# Patient Record
Sex: MALE | Race: WHITE | NOT HISPANIC OR LATINO | RURAL
[De-identification: names, ages, dates, MRNs, and addresses within clinical notes are randomized per-mention and may not be internally consistent; named-entity substitution may affect disease eponyms.]

---

## 2018-11-28 ENCOUNTER — HISTORICAL (OUTPATIENT)
Dept: ADMINISTRATIVE | Facility: HOSPITAL | Age: 73
End: 2018-11-28

## 2018-11-30 LAB
LAB AP CLINICAL INFORMATION: NORMAL
LAB AP COMMENTS: NORMAL
LAB AP DIAGNOSIS - HISTORICAL: NORMAL
LAB AP GROSS PATHOLOGY - HISTORICAL: NORMAL
LAB AP SPECIMEN SUBMITTED - HISTORICAL: NORMAL

## 2018-12-17 ENCOUNTER — HISTORICAL (OUTPATIENT)
Dept: ADMINISTRATIVE | Facility: HOSPITAL | Age: 73
End: 2018-12-17

## 2018-12-19 LAB
LAB AP CLINICAL INFORMATION: NORMAL
LAB AP DIAGNOSIS - HISTORICAL: NORMAL
LAB AP GROSS PATHOLOGY - HISTORICAL: NORMAL
LAB AP SPECIMEN SUBMITTED - HISTORICAL: NORMAL

## 2020-06-17 ENCOUNTER — HISTORICAL (OUTPATIENT)
Dept: ADMINISTRATIVE | Facility: HOSPITAL | Age: 75
End: 2020-06-17

## 2020-06-19 LAB
INSULIN SERPL-ACNC: NORMAL U[IU]/ML
LAB AP CLINICAL INFORMATION: NORMAL
LAB AP COMMENTS: NORMAL
LAB AP DIAGNOSIS - HISTORICAL: NORMAL
LAB AP GROSS PATHOLOGY - HISTORICAL: NORMAL
LAB AP SPECIMEN SUBMITTED - HISTORICAL: NORMAL

## 2021-04-13 ENCOUNTER — OFFICE VISIT (OUTPATIENT)
Dept: DERMATOLOGY | Facility: CLINIC | Age: 76
End: 2021-04-13
Payer: COMMERCIAL

## 2021-04-13 VITALS — RESPIRATION RATE: 18 BRPM | HEIGHT: 73 IN | BODY MASS INDEX: 23.86 KG/M2 | WEIGHT: 180 LBS

## 2021-04-13 DIAGNOSIS — L82.1 SEBORRHEIC KERATOSES: ICD-10-CM

## 2021-04-13 DIAGNOSIS — B35.3 TINEA PEDIS OF BOTH FEET: Primary | ICD-10-CM

## 2021-04-13 DIAGNOSIS — Z79.899 HIGH RISK MEDICATION USE: ICD-10-CM

## 2021-04-13 DIAGNOSIS — L57.0 ACTINIC KERATOSES: ICD-10-CM

## 2021-04-13 DIAGNOSIS — L91.0 HYPERTROPHIC SCAR: ICD-10-CM

## 2021-04-13 PROCEDURE — 11900 INJECT SKIN LESIONS </W 7: CPT | Mod: XU,51,, | Performed by: DERMATOLOGY

## 2021-04-13 PROCEDURE — 11900 PR INJECTION INTO SKIN LESIONS, UP TO 7: ICD-10-PCS | Mod: XU,51,, | Performed by: DERMATOLOGY

## 2021-04-13 PROCEDURE — 99213 PR OFFICE/OUTPT VISIT, EST, LEVL III, 20-29 MIN: ICD-10-PCS | Mod: 25,,, | Performed by: DERMATOLOGY

## 2021-04-13 PROCEDURE — 1101F PT FALLS ASSESS-DOCD LE1/YR: CPT | Mod: ,,, | Performed by: DERMATOLOGY

## 2021-04-13 PROCEDURE — 17000 DESTRUCT PREMALG LESION: CPT | Mod: ,,, | Performed by: DERMATOLOGY

## 2021-04-13 PROCEDURE — 17003 DESTRUCT PREMALG LES 2-14: CPT | Mod: ,,, | Performed by: DERMATOLOGY

## 2021-04-13 PROCEDURE — 1159F MED LIST DOCD IN RCRD: CPT | Mod: ,,, | Performed by: DERMATOLOGY

## 2021-04-13 PROCEDURE — 99213 OFFICE O/P EST LOW 20 MIN: CPT | Mod: 25,,, | Performed by: DERMATOLOGY

## 2021-04-13 PROCEDURE — 1101F PR PT FALLS ASSESS DOC 0-1 FALLS W/OUT INJ PAST YR: ICD-10-PCS | Mod: ,,, | Performed by: DERMATOLOGY

## 2021-04-13 PROCEDURE — 3288F FALL RISK ASSESSMENT DOCD: CPT | Mod: ,,, | Performed by: DERMATOLOGY

## 2021-04-13 PROCEDURE — 1159F PR MEDICATION LIST DOCUMENTED IN MEDICAL RECORD: ICD-10-PCS | Mod: ,,, | Performed by: DERMATOLOGY

## 2021-04-13 PROCEDURE — 17000 PR DESTRUCTION(LASER SURGERY,CRYOSURGERY,CHEMOSURGERY),PREMALIGNANT LESIONS,FIRST LESION: ICD-10-PCS | Mod: ,,, | Performed by: DERMATOLOGY

## 2021-04-13 PROCEDURE — 3288F PR FALLS RISK ASSESSMENT DOCUMENTED: ICD-10-PCS | Mod: ,,, | Performed by: DERMATOLOGY

## 2021-04-13 PROCEDURE — 17003 DESTRUCTION, PREMALIGNANT LESIONS; SECOND THROUGH 14 LESIONS: ICD-10-PCS | Mod: ,,, | Performed by: DERMATOLOGY

## 2021-04-13 RX ORDER — SERTRALINE HYDROCHLORIDE 50 MG/1
TABLET, FILM COATED ORAL
COMMUNITY
Start: 2021-03-15

## 2021-04-13 RX ORDER — TERBINAFINE HYDROCHLORIDE 250 MG/1
250 TABLET ORAL DAILY
COMMUNITY
Start: 2021-01-13

## 2021-04-13 RX ORDER — APIXABAN 2.5 MG/1
TABLET, FILM COATED ORAL
COMMUNITY
Start: 2021-03-15

## 2021-04-13 RX ORDER — CELECOXIB 100 MG/1
100 CAPSULE ORAL DAILY
COMMUNITY
Start: 2021-03-11

## 2021-04-13 RX ORDER — TRIAMCINOLONE ACETONIDE 40 MG/ML
10 INJECTION, SUSPENSION INTRA-ARTICULAR; INTRAMUSCULAR
Status: COMPLETED | OUTPATIENT
Start: 2021-04-13 | End: 2021-04-13

## 2021-04-13 RX ORDER — ATORVASTATIN CALCIUM 10 MG/1
10 TABLET, FILM COATED ORAL DAILY
COMMUNITY
Start: 2021-04-05

## 2021-04-13 RX ORDER — OMEPRAZOLE 20 MG/1
CAPSULE, DELAYED RELEASE ORAL
COMMUNITY
Start: 2021-03-15

## 2021-04-13 RX ORDER — AMLODIPINE BESYLATE 5 MG/1
5 TABLET ORAL DAILY
COMMUNITY
Start: 2021-04-07

## 2021-04-13 RX ORDER — CLOBETASOL PROPIONATE 0.5 MG/G
CREAM TOPICAL
COMMUNITY
Start: 2021-01-13 | End: 2021-04-13

## 2021-04-13 RX ORDER — TIOTROPIUM BROMIDE 18 UG/1
CAPSULE ORAL; RESPIRATORY (INHALATION)
COMMUNITY
Start: 2021-03-15

## 2021-04-13 RX ORDER — BENZONATATE 100 MG/1
CAPSULE ORAL
COMMUNITY
Start: 2021-01-06

## 2021-04-13 RX ORDER — HYDROCODONE BITARTRATE AND ACETAMINOPHEN 7.5; 325 MG/1; MG/1
TABLET ORAL
COMMUNITY
Start: 2021-02-20

## 2021-04-13 RX ADMIN — TRIAMCINOLONE ACETONIDE 12 MG: 40 INJECTION, SUSPENSION INTRA-ARTICULAR; INTRAMUSCULAR at 02:04

## 2021-07-14 ENCOUNTER — OFFICE VISIT (OUTPATIENT)
Dept: DERMATOLOGY | Facility: CLINIC | Age: 76
End: 2021-07-14
Payer: COMMERCIAL

## 2021-07-14 DIAGNOSIS — L57.0 ACTINIC KERATOSES: ICD-10-CM

## 2021-07-14 DIAGNOSIS — Z85.828 HISTORY OF BASAL CELL CARCINOMA OF SKIN: ICD-10-CM

## 2021-07-14 DIAGNOSIS — L57.8 OTHER SKIN CHANGES DUE TO CHRONIC EXPOSURE TO NONIONIZING RADIATION: Primary | ICD-10-CM

## 2021-07-14 DIAGNOSIS — L82.1 SEBORRHEIC KERATOSES: ICD-10-CM

## 2021-07-14 DIAGNOSIS — D48.5 NEOPLASM OF UNCERTAIN BEHAVIOR OF SKIN: ICD-10-CM

## 2021-07-14 PROCEDURE — 17000 DESTRUCT PREMALG LESION: CPT | Mod: XU,,, | Performed by: DERMATOLOGY

## 2021-07-14 PROCEDURE — 88305 TISSUE EXAM BY PATHOLOGIST: CPT | Mod: 26,,, | Performed by: PATHOLOGY

## 2021-07-14 PROCEDURE — 99213 PR OFFICE/OUTPT VISIT, EST, LEVL III, 20-29 MIN: ICD-10-PCS | Mod: 25,,, | Performed by: DERMATOLOGY

## 2021-07-14 PROCEDURE — 1159F PR MEDICATION LIST DOCUMENTED IN MEDICAL RECORD: ICD-10-PCS | Mod: ,,, | Performed by: DERMATOLOGY

## 2021-07-14 PROCEDURE — 99213 OFFICE O/P EST LOW 20 MIN: CPT | Mod: 25,,, | Performed by: DERMATOLOGY

## 2021-07-14 PROCEDURE — 88305 TISSUE EXAM BY PATHOLOGIST: CPT | Mod: SUR | Performed by: DERMATOLOGY

## 2021-07-14 PROCEDURE — 17000 PR DESTRUCTION(LASER SURGERY,CRYOSURGERY,CHEMOSURGERY),PREMALIGNANT LESIONS,FIRST LESION: ICD-10-PCS | Mod: XU,,, | Performed by: DERMATOLOGY

## 2021-07-14 PROCEDURE — 11102 TANGNTL BX SKIN SINGLE LES: CPT | Mod: ,,, | Performed by: DERMATOLOGY

## 2021-07-14 PROCEDURE — 88305 PATHOLOGY, DERMATOLOGY: ICD-10-PCS | Mod: 26,,, | Performed by: PATHOLOGY

## 2021-07-14 PROCEDURE — 11102 PR TANGENTIAL BIOPSY, SKIN, SINGLE LESION: ICD-10-PCS | Mod: ,,, | Performed by: DERMATOLOGY

## 2021-07-14 PROCEDURE — 1159F MED LIST DOCD IN RCRD: CPT | Mod: ,,, | Performed by: DERMATOLOGY

## 2021-07-14 PROCEDURE — 17003 DESTRUCT PREMALG LES 2-14: CPT | Mod: XU,,, | Performed by: DERMATOLOGY

## 2021-07-14 PROCEDURE — 17003 DESTRUCTION, PREMALIGNANT LESIONS; SECOND THROUGH 14 LESIONS: ICD-10-PCS | Mod: XU,,, | Performed by: DERMATOLOGY

## 2021-07-16 LAB
ESTROGEN SERPL-MCNC: NORMAL PG/ML
LAB AP GROSS DESCRIPTION: NORMAL
LAB AP LABORATORY NOTES: NORMAL
LAB AP SPEC A DDX: NORMAL
LAB AP SPEC A MORPHOLOGY: NORMAL
LAB AP SPEC A PROCEDURE: NORMAL
T3RU NFR SERPL: NORMAL %

## 2021-07-26 ENCOUNTER — OFFICE VISIT (OUTPATIENT)
Dept: DERMATOLOGY | Facility: CLINIC | Age: 76
End: 2021-07-26
Payer: COMMERCIAL

## 2021-07-26 VITALS
BODY MASS INDEX: 23.84 KG/M2 | HEART RATE: 94 BPM | WEIGHT: 179.88 LBS | DIASTOLIC BLOOD PRESSURE: 89 MMHG | RESPIRATION RATE: 18 BRPM | SYSTOLIC BLOOD PRESSURE: 169 MMHG | HEIGHT: 73 IN | OXYGEN SATURATION: 97 %

## 2021-07-26 DIAGNOSIS — C44.311 BASAL CELL CARCINOMA (BCC) OF DORSUM OF NOSE: Primary | ICD-10-CM

## 2021-07-26 PROCEDURE — 1160F RVW MEDS BY RX/DR IN RCRD: CPT | Mod: ,,, | Performed by: STUDENT IN AN ORGANIZED HEALTH CARE EDUCATION/TRAINING PROGRAM

## 2021-07-26 PROCEDURE — 1101F PT FALLS ASSESS-DOCD LE1/YR: CPT | Mod: ,,, | Performed by: STUDENT IN AN ORGANIZED HEALTH CARE EDUCATION/TRAINING PROGRAM

## 2021-07-26 PROCEDURE — 1101F PR PT FALLS ASSESS DOC 0-1 FALLS W/OUT INJ PAST YR: ICD-10-PCS | Mod: ,,, | Performed by: STUDENT IN AN ORGANIZED HEALTH CARE EDUCATION/TRAINING PROGRAM

## 2021-07-26 PROCEDURE — 1160F PR REVIEW ALL MEDS BY PRESCRIBER/CLIN PHARMACIST DOCUMENTED: ICD-10-PCS | Mod: ,,, | Performed by: STUDENT IN AN ORGANIZED HEALTH CARE EDUCATION/TRAINING PROGRAM

## 2021-07-26 PROCEDURE — 3288F FALL RISK ASSESSMENT DOCD: CPT | Mod: ,,, | Performed by: STUDENT IN AN ORGANIZED HEALTH CARE EDUCATION/TRAINING PROGRAM

## 2021-07-26 PROCEDURE — 13152 CMPLX RPR E/N/E/L 2.6-7.5 CM: CPT | Mod: 51,,, | Performed by: STUDENT IN AN ORGANIZED HEALTH CARE EDUCATION/TRAINING PROGRAM

## 2021-07-26 PROCEDURE — 17311: ICD-10-PCS | Mod: ,,, | Performed by: STUDENT IN AN ORGANIZED HEALTH CARE EDUCATION/TRAINING PROGRAM

## 2021-07-26 PROCEDURE — 1159F MED LIST DOCD IN RCRD: CPT | Mod: ,,, | Performed by: STUDENT IN AN ORGANIZED HEALTH CARE EDUCATION/TRAINING PROGRAM

## 2021-07-26 PROCEDURE — 17311 MOHS 1 STAGE H/N/HF/G: CPT | Mod: ,,, | Performed by: STUDENT IN AN ORGANIZED HEALTH CARE EDUCATION/TRAINING PROGRAM

## 2021-07-26 PROCEDURE — 13152 PR RECMPL WND LID,NOS,EAR 2.6-7.5 CM: ICD-10-PCS | Mod: 51,,, | Performed by: STUDENT IN AN ORGANIZED HEALTH CARE EDUCATION/TRAINING PROGRAM

## 2021-07-26 PROCEDURE — 1159F PR MEDICATION LIST DOCUMENTED IN MEDICAL RECORD: ICD-10-PCS | Mod: ,,, | Performed by: STUDENT IN AN ORGANIZED HEALTH CARE EDUCATION/TRAINING PROGRAM

## 2021-07-26 PROCEDURE — 3288F PR FALLS RISK ASSESSMENT DOCUMENTED: ICD-10-PCS | Mod: ,,, | Performed by: STUDENT IN AN ORGANIZED HEALTH CARE EDUCATION/TRAINING PROGRAM

## 2021-08-02 ENCOUNTER — CLINICAL SUPPORT (OUTPATIENT)
Dept: DERMATOLOGY | Facility: CLINIC | Age: 76
End: 2021-08-02

## 2021-08-02 DIAGNOSIS — Z48.89 ENCOUNTER FOR POST SURGICAL WOUND CHECK: Primary | ICD-10-CM

## 2021-09-07 ENCOUNTER — OFFICE VISIT (OUTPATIENT)
Dept: DERMATOLOGY | Facility: CLINIC | Age: 76
End: 2021-09-07
Payer: COMMERCIAL

## 2021-09-07 VITALS — WEIGHT: 179 LBS | BODY MASS INDEX: 23.72 KG/M2 | RESPIRATION RATE: 18 BRPM | HEIGHT: 73 IN

## 2021-09-07 DIAGNOSIS — Z48.89 ENCOUNTER FOR POST SURGICAL WOUND CHECK: Primary | ICD-10-CM

## 2021-09-07 PROCEDURE — 99024 PR POST-OP FOLLOW-UP VISIT: ICD-10-PCS | Mod: ,,, | Performed by: STUDENT IN AN ORGANIZED HEALTH CARE EDUCATION/TRAINING PROGRAM

## 2021-09-07 PROCEDURE — 1159F PR MEDICATION LIST DOCUMENTED IN MEDICAL RECORD: ICD-10-PCS | Mod: ,,, | Performed by: STUDENT IN AN ORGANIZED HEALTH CARE EDUCATION/TRAINING PROGRAM

## 2021-09-07 PROCEDURE — 1101F PT FALLS ASSESS-DOCD LE1/YR: CPT | Mod: ,,, | Performed by: STUDENT IN AN ORGANIZED HEALTH CARE EDUCATION/TRAINING PROGRAM

## 2021-09-07 PROCEDURE — 3288F PR FALLS RISK ASSESSMENT DOCUMENTED: ICD-10-PCS | Mod: ,,, | Performed by: STUDENT IN AN ORGANIZED HEALTH CARE EDUCATION/TRAINING PROGRAM

## 2021-09-07 PROCEDURE — 1101F PR PT FALLS ASSESS DOC 0-1 FALLS W/OUT INJ PAST YR: ICD-10-PCS | Mod: ,,, | Performed by: STUDENT IN AN ORGANIZED HEALTH CARE EDUCATION/TRAINING PROGRAM

## 2021-09-07 PROCEDURE — 99024 POSTOP FOLLOW-UP VISIT: CPT | Mod: ,,, | Performed by: STUDENT IN AN ORGANIZED HEALTH CARE EDUCATION/TRAINING PROGRAM

## 2021-09-07 PROCEDURE — 1159F MED LIST DOCD IN RCRD: CPT | Mod: ,,, | Performed by: STUDENT IN AN ORGANIZED HEALTH CARE EDUCATION/TRAINING PROGRAM

## 2021-09-07 PROCEDURE — 3288F FALL RISK ASSESSMENT DOCD: CPT | Mod: ,,, | Performed by: STUDENT IN AN ORGANIZED HEALTH CARE EDUCATION/TRAINING PROGRAM

## 2022-01-18 ENCOUNTER — OFFICE VISIT (OUTPATIENT)
Dept: DERMATOLOGY | Facility: CLINIC | Age: 77
End: 2022-01-18
Payer: COMMERCIAL

## 2022-01-18 VITALS — WEIGHT: 179 LBS | HEIGHT: 73 IN | BODY MASS INDEX: 23.72 KG/M2 | RESPIRATION RATE: 18 BRPM

## 2022-01-18 DIAGNOSIS — D48.5 NEOPLASM OF UNCERTAIN BEHAVIOR OF SKIN: ICD-10-CM

## 2022-01-18 DIAGNOSIS — Z85.828 HISTORY OF BASAL CELL CARCINOMA (BCC): ICD-10-CM

## 2022-01-18 DIAGNOSIS — L82.1 SK (SEBORRHEIC KERATOSIS): ICD-10-CM

## 2022-01-18 DIAGNOSIS — L57.8 OTHER SKIN CHANGES DUE TO CHRONIC EXPOSURE TO NONIONIZING RADIATION: Primary | ICD-10-CM

## 2022-01-18 DIAGNOSIS — L57.0 ACTINIC KERATOSES: ICD-10-CM

## 2022-01-18 DIAGNOSIS — L82.0 SEBORRHEIC KERATOSES, INFLAMED: ICD-10-CM

## 2022-01-18 PROCEDURE — 3288F FALL RISK ASSESSMENT DOCD: CPT | Mod: ,,, | Performed by: DERMATOLOGY

## 2022-01-18 PROCEDURE — 99213 PR OFFICE/OUTPT VISIT, EST, LEVL III, 20-29 MIN: ICD-10-PCS | Mod: 25,,, | Performed by: DERMATOLOGY

## 2022-01-18 PROCEDURE — 3288F PR FALLS RISK ASSESSMENT DOCUMENTED: ICD-10-PCS | Mod: ,,, | Performed by: DERMATOLOGY

## 2022-01-18 PROCEDURE — 1159F PR MEDICATION LIST DOCUMENTED IN MEDICAL RECORD: ICD-10-PCS | Mod: ,,, | Performed by: DERMATOLOGY

## 2022-01-18 PROCEDURE — 1101F PT FALLS ASSESS-DOCD LE1/YR: CPT | Mod: ,,, | Performed by: DERMATOLOGY

## 2022-01-18 PROCEDURE — 88305 TISSUE EXAM BY PATHOLOGIST: CPT | Mod: SUR | Performed by: DERMATOLOGY

## 2022-01-18 PROCEDURE — 17003 DESTRUCTION, PREMALIGNANT LESIONS; SECOND THROUGH 14 LESIONS: ICD-10-PCS | Mod: XU,,, | Performed by: DERMATOLOGY

## 2022-01-18 PROCEDURE — 1160F PR REVIEW ALL MEDS BY PRESCRIBER/CLIN PHARMACIST DOCUMENTED: ICD-10-PCS | Mod: ,,, | Performed by: DERMATOLOGY

## 2022-01-18 PROCEDURE — 17110 PR DESTRUCTION BENIGN LESIONS UP TO 14: ICD-10-PCS | Mod: ,,, | Performed by: DERMATOLOGY

## 2022-01-18 PROCEDURE — 11102 PR TANGENTIAL BIOPSY, SKIN, SINGLE LESION: ICD-10-PCS | Mod: XU,51,, | Performed by: DERMATOLOGY

## 2022-01-18 PROCEDURE — 88305 TISSUE EXAM BY PATHOLOGIST: CPT | Mod: 26,,, | Performed by: PATHOLOGY

## 2022-01-18 PROCEDURE — 99213 OFFICE O/P EST LOW 20 MIN: CPT | Mod: 25,,, | Performed by: DERMATOLOGY

## 2022-01-18 PROCEDURE — 88305 PATHOLOGY, DERMATOLOGY: ICD-10-PCS | Mod: 26,,, | Performed by: PATHOLOGY

## 2022-01-18 PROCEDURE — 1159F MED LIST DOCD IN RCRD: CPT | Mod: ,,, | Performed by: DERMATOLOGY

## 2022-01-18 PROCEDURE — 1101F PR PT FALLS ASSESS DOC 0-1 FALLS W/OUT INJ PAST YR: ICD-10-PCS | Mod: ,,, | Performed by: DERMATOLOGY

## 2022-01-18 PROCEDURE — 17000 DESTRUCT PREMALG LESION: CPT | Mod: XU,51,, | Performed by: DERMATOLOGY

## 2022-01-18 PROCEDURE — 1160F RVW MEDS BY RX/DR IN RCRD: CPT | Mod: ,,, | Performed by: DERMATOLOGY

## 2022-01-18 PROCEDURE — 17000 PR DESTRUCTION(LASER SURGERY,CRYOSURGERY,CHEMOSURGERY),PREMALIGNANT LESIONS,FIRST LESION: ICD-10-PCS | Mod: XU,51,, | Performed by: DERMATOLOGY

## 2022-01-18 PROCEDURE — 17003 DESTRUCT PREMALG LES 2-14: CPT | Mod: XU,,, | Performed by: DERMATOLOGY

## 2022-01-18 PROCEDURE — 11102 TANGNTL BX SKIN SINGLE LES: CPT | Mod: XU,51,, | Performed by: DERMATOLOGY

## 2022-01-18 PROCEDURE — 17110 DESTRUCTION B9 LES UP TO 14: CPT | Mod: ,,, | Performed by: DERMATOLOGY

## 2022-01-18 RX ORDER — KETOROLAC TROMETHAMINE 5 MG/ML
SOLUTION OPHTHALMIC
COMMUNITY
Start: 2021-12-07

## 2022-01-18 RX ORDER — MONTELUKAST SODIUM 10 MG/1
10 TABLET ORAL 2 TIMES DAILY
COMMUNITY
Start: 2021-11-17

## 2022-01-18 RX ORDER — PREDNISOLONE ACETATE 10 MG/ML
SUSPENSION/ DROPS OPHTHALMIC
COMMUNITY
Start: 2021-12-07

## 2022-01-18 RX ORDER — OFLOXACIN 3 MG/ML
SOLUTION/ DROPS OPHTHALMIC
COMMUNITY
Start: 2021-12-07

## 2022-01-18 NOTE — PATIENT INSTRUCTIONS
Sunscreen Recommendations   I recommended a broad spectrum sunscreen with a SPF of 30 or higher that is water-resistant. SPF 30 sunscreens block approximately 97 percent of the sun's harmful rays.    Sunscreens should be applied at least 15 minutes prior to expected sun exposure and then every 90 minutes after that as long as sun exposure continues.    If swimming or exercising sunscreen should be reapplied every 45 minutes to an hour after getting wet or sweating.   One ounce, or the equivalent of a shot glass full of sunscreen, is adequate to protect the skin not covered by a bathing suit.    I also recommended a lip balm with a sunscreen as well.   Healthy Sun Protective Behaviors   Sun protective clothing can be used in lieu of sunscreen but must be worn the entire time you are exposed to the sun's rays.   Seek shade between 10 a.m. and 2 p.m.   Use extra caution near water, snow, or sand as they reflect sun rays   Avoid tanning beds and consider sunless self-tanning products instead   Perform monthly self skin exams     Cryotherapy   There will likely be a blister.    Clean the area daily with dial antibacterial soap and water.    Apply vaseline as needed.    The area will take 1-2 weeks to heal.    Biopsy Site Care   Starting tomorrow you may shower and wash the area with dial antibacterial soap   Pat dry and apply vaseline and a bandaid   Perform this routine for three days   The area will be irritated, sore, slightly red, and may itch, sting, or burn   Do not apply make-up to the area until it is healed   There will be a scar   The area will take 1-2 weeks to heal   No soaking in the tub or hot tub for one week

## 2022-01-18 NOTE — PROGRESS NOTES
[facility]   Caryl Watson MD    Patient Name: Tl Martinez  Patient YOB: 1945   Date of Service: 1/18/22    CC: Above the waist exam    HPI: Tl Martinez is a 77 y.o. male presents today for an above the waist skin exam.  Patient was last seen 7/21 and dermatologic history includes BCC. Patient is concerned today about a lesion located on the left cheek.  It has been present for 1 year(s). It has not been treated in the past.      Past Medical History:   Diagnosis Date    Atrial fibrillation     HTN (hypertension)      Past Surgical History:   Procedure Laterality Date    BASAL CELL CARCINOMA EXCISION      EXCISION OF SQUAMOUS CELL CARCINOMA       Review of patient's allergies indicates:  No Known Allergies    Current Outpatient Medications:     amLODIPine (NORVASC) 5 MG tablet, Take 5 mg by mouth once daily., Disp: , Rfl:     atorvastatin (LIPITOR) 10 MG tablet, Take 10 mg by mouth once daily., Disp: , Rfl:     benzonatate (TESSALON) 100 MG capsule, TAKE 2 CAPSULES BY MOUTH THREE TIMES DAILY AS NEEDED FOR COUGH, Disp: , Rfl:     celecoxib (CELEBREX) 100 MG capsule, Take 100 mg by mouth once daily., Disp: , Rfl:     ELIQUIS 2.5 mg Tab, , Disp: , Rfl:     HYDROcodone-acetaminophen (NORCO) 7.5-325 mg per tablet, TAKE 1/2 TO 1 TABLET BY MOUTH DAILY AS NEEDED, Disp: , Rfl:     ketorolac 0.5% (ACULAR) 0.5 % Drop, , Disp: , Rfl:     montelukast (SINGULAIR) 10 mg tablet, Take 10 mg by mouth 2 (two) times daily., Disp: , Rfl:     ofloxacin (OCUFLOX) 0.3 % ophthalmic solution, , Disp: , Rfl:     omeprazole (PRILOSEC) 20 MG capsule, , Disp: , Rfl:     prednisoLONE acetate (PRED FORTE) 1 % DrpS, , Disp: , Rfl:     sertraline (ZOLOFT) 50 MG tablet, , Disp: , Rfl:     SPIRIVA WITH HANDIHALER 18 mcg inhalation capsule, , Disp: , Rfl:     terbinafine HCL (LAMISIL) 250 mg tablet, Take 250 mg by mouth once daily., Disp: , Rfl:     ROS: A focused review of systems was  obtained and negative.     Exam: A sun exposed skin exam was performed including scalp, hair, face, neck, chest, back, abdomen, right arm, left arm, right hand, left hand, and nailsnails..  All areas examined were normal expect as per below in assessment and plan.  General Appearance of the patient is well developed and well nourished.  Orientation: alert and oriented x 3.  Mood and affect: pleasant.    Assessment:   The primary encounter diagnosis was Other skin changes due to chronic exposure to nonionizing radiation. Diagnoses of History of basal cell carcinoma (BCC), SK (seborrheic keratosis), Neoplasm of uncertain behavior of skin, Seborrheic keratoses, inflamed, and Actinic keratoses were also pertinent to this visit.    Plan:       Actinic Keratoses(L57.0)  - Erythematous patches and papules with hyperkeratotic scale distributed on the right hand, right forearm, and left cheek    Plan: Counseling.  I counseled the patient regarding the following:  Skin Care: Sun protective clothing and broad spectrum sunscreen can prevent the formation of Actinic  Keratoses. AKs can resolve with cryotherapy, photodynamic therapy, imiquimod, topical 5-FU.  Expectations: Actinic Keratoses are precancerous proliferations that occur within sun damaged skin. If untreated,  a small subset of AKs can develop into Squamous Cell Carcinoma.  Contact Office if: If AKs fail to resolve despite treatment, or if you develop a side effect from therapy, such as  unbearable crusting, scabbing, redness and tenderness.    Plan: Liquid Nitrogen.  A total of 4 lesions were treated with liquid nitrogen for 2 freeze-thaw cycles lasting 5 seconds, located on the above locations.   The patient's consent was obtained including but not limited to risks of crusting, scabbing,  blistering, scarring, darker or lighter pigmentary change, recurrence, incomplete removal and infection.    Seborrheic Keratosis (L82.1)  - Stuck-on, warty, greasy brown papule  with pseudo-horn cysts scattered on the trunk and extremities    Plan: Counseling.  I counseled the patient regarding the following:  Skin Care: Seborrheic Keratoses are benign. No treatment is necessary.  Expectations: Seborrheic Keratoses are benign warty growths. Patients get more of them as they age    Plan: Reassurance  Irritated Seborrheic Keratoses (L82.0)  Stuck-on inflamed papules with crust located on the neck  Associated diagnoses: Pruritus and Cutaneous Inflammation    Plan: Liquid Nitrogen.  A total of 3 lesions were treated with liquid nitrogen, located on the above listed location.  This procedure was medically necessary because the lesions that were treated were: irritated and itchy. The  patient's consent was obtained including but not limited to risks of crusting, scabbing, blistering, scarring, darker  or lighter pigmentary change, recurrence, incomplete removal and infection.  Neoplasm of Uncertain Behavior (D48.5)  - pearly papule located on the right upper back  Ddx includes: ISK vs BCC    Plan: Counseling.  I counseled the patient regarding the following:  Instructions: Neoplasms of Uncertain Behavior can be observed, biopsied or surgically removed depending on the  level of clinical suspicion.  Instructions: Neoplasms of Uncertain Behavior can be observed, biopsied or surgically removed depending on the  level of clinical suspicion.  Contact Office if: patient develops any new lesions that fail to heal, ulcerate or bleed.    Plan: Biopsy by Shave Method.  Location (1): right upper back  Written consent was obtained and risks were reviewed including but not  limited to scarring, infection, bleeding, scabbing, incomplete removal, nerve damage and allergy to anesthesia.  The area was prepped with Chloraprep. Local anesthesia was obtained with approximately 0.5cc of 1% lidocaine  with epinephrine. A biopsy by shave method to the level of the dermis (sent for H and E) was performed using  a  Dermablade on the above location. Aluminum chloride was used for hemostasis. Following the biopsy  Petrolatum and a bandage were applied. Patient will be notified of biopsy results. However, patient instructed to  call the office if not contacted within 2 weeks.    History of non-melanoma skin cancer (Z85.828)  - Well healed scar with NER  Associated diagnosis: Medical surveillance following completed treatment    Plan: Monitoring.  Other Skin Changes Due to Chronic Exposure of Nonionizing Radiation (L57.8)    Plan: Monitoring.     Plan: Sunscreen Recommendations.  I recommended a broad spectrum sunscreen with a SPF of 30 or higher. I explained that SPF 30 sunscreens block approximately 97 percent of the  sun's harmful rays. Sunscreens should be applied at least 15 minutes prior to expected sun exposure and then every 2 hours after that as long as  sun exposure continues. If swimming or exercising sunscreen should be reapplied every 45 minutes to an hour after getting wet or sweating. One  ounce, or the equivalent of a shot glass full of sunscreen, is adequate to protect the skin not covered by a bathing suit. I also recommended a lip  balm with a sunscreen as well. Sun protective clothing can be used in lieu of sunscreen but must be worn the entire time you are exposed to the  sun's rays.      Follow up in about 6 months (around 7/18/2022) for Skin Check.    Caryl Watson MD

## 2022-01-20 LAB
DHEA SERPL-MCNC: NORMAL
ESTROGEN SERPL-MCNC: NORMAL PG/ML
LAB AP GROSS DESCRIPTION: NORMAL
LAB AP LABORATORY NOTES: NORMAL
LAB AP SPEC A DDX: NORMAL
LAB AP SPEC A MORPHOLOGY: NORMAL
LAB AP SPEC A PROCEDURE: NORMAL
T3RU NFR SERPL: NORMAL %

## 2022-01-28 NOTE — PATIENT INSTRUCTIONS
ED&C   Starting tomorrow you may change the bandage.   Clean with Dial antibacterial soap and pat dry.   Apply Vaseline or Mupirocin ointment, then apply clean bandage.    Repeat above steps for 7 days.   On day 8, wash with Dial antibacterial soap, apply Vaseline or Mupirocin ointment then leave open.    No swimming, soaking in bath tub, or hot tub until it is scabbed over.    Only Tylenol is recommended for pain. No Advil, Ibuprofen, Aleve, Motrin, or Aspirin.   There will be a scar.

## 2022-01-28 NOTE — PROGRESS NOTES
[facility]   Caryl Watson MD    Patient Name: Tl Martinez  Patient YOB: 1945   Date of Service: 2/1/22    CC: EDC    HPI: Tl Martinez is a 77 y.o. male here today for EDC of BCC, located on the right upper back.      Past Medical History:   Diagnosis Date    Atrial fibrillation     HTN (hypertension)      Past Surgical History:   Procedure Laterality Date    BASAL CELL CARCINOMA EXCISION      EXCISION OF SQUAMOUS CELL CARCINOMA       Review of patient's allergies indicates:  No Known Allergies    Current Outpatient Medications:     amLODIPine (NORVASC) 5 MG tablet, Take 5 mg by mouth once daily., Disp: , Rfl:     atorvastatin (LIPITOR) 10 MG tablet, Take 10 mg by mouth once daily., Disp: , Rfl:     benzonatate (TESSALON) 100 MG capsule, TAKE 2 CAPSULES BY MOUTH THREE TIMES DAILY AS NEEDED FOR COUGH, Disp: , Rfl:     celecoxib (CELEBREX) 100 MG capsule, Take 100 mg by mouth once daily., Disp: , Rfl:     ELIQUIS 2.5 mg Tab, , Disp: , Rfl:     HYDROcodone-acetaminophen (NORCO) 7.5-325 mg per tablet, TAKE 1/2 TO 1 TABLET BY MOUTH DAILY AS NEEDED, Disp: , Rfl:     ketorolac 0.5% (ACULAR) 0.5 % Drop, , Disp: , Rfl:     montelukast (SINGULAIR) 10 mg tablet, Take 10 mg by mouth 2 (two) times daily., Disp: , Rfl:     ofloxacin (OCUFLOX) 0.3 % ophthalmic solution, , Disp: , Rfl:     omeprazole (PRILOSEC) 20 MG capsule, , Disp: , Rfl:     prednisoLONE acetate (PRED FORTE) 1 % DrpS, , Disp: , Rfl:     sertraline (ZOLOFT) 50 MG tablet, , Disp: , Rfl:     SPIRIVA WITH HANDIHALER 18 mcg inhalation capsule, , Disp: , Rfl:     terbinafine HCL (LAMISIL) 250 mg tablet, Take 250 mg by mouth once daily., Disp: , Rfl:     ROS: A focused review of systems was obtained and negative.     Exam: A focused skin exam was performed and the biopsy site was identified.  General Appearance of the patient is well developed and well nourished.  Orientation: alert and oriented x 3.  Mood  and affect: pleasant.    Vitals:    02/01/22 1041   Resp: 18       Assessment:   The encounter diagnosis was Basal cell carcinoma (BCC) of skin of other part of torso.    Plan: Curettage and Destruction  Location: right upper back    Accession Number: [id number]  Initial Size: 1.1 cm  Final Size: 1.2 cm    Consent was obtained from the patient. The risks, benefits and alternatives to therapy were discussed in detail. Specifically, the risks of infection, scarring, bleeding, prolonged wound healing, nerve injury, incomplete removal, allergy to anesthesia and recurrence were addressed. Alternatives to ED&C, such as: surgical removal and XRT were also discussed. Prior to the procedure, the treatment site was clearly identified and confirmed by the patient and biopsy photo. All components of Universal Protocol/PAUSE Rule completed.    The photograph obtained at the biopsy was reviewed prior to proceeding with the procedure. The lesion was treated with Curettage and Destruction. The area was prepped with Chloraprep. Local anesthesia was achieved with 6 cc of 1% lidocaine with epinephrine. The tumor was removed by curettage and electrodesiccation. 3 cycles were required to remove all clinically apparent tumor. The size of the lesion after curettage was as listed above. The wound was cleaned, and a pressure dressing was applied. The patient received detailed post-op instructions.           Follow up if symptoms worsen or fail to improve.    Caryl Watson MD

## 2022-02-01 ENCOUNTER — PROCEDURE VISIT (OUTPATIENT)
Dept: DERMATOLOGY | Facility: CLINIC | Age: 77
End: 2022-02-01
Payer: COMMERCIAL

## 2022-02-01 VITALS — BODY MASS INDEX: 23.72 KG/M2 | WEIGHT: 179 LBS | RESPIRATION RATE: 18 BRPM | HEIGHT: 73 IN

## 2022-02-01 DIAGNOSIS — C44.519 BASAL CELL CARCINOMA (BCC) OF SKIN OF OTHER PART OF TORSO: Primary | ICD-10-CM

## 2022-02-01 PROCEDURE — 99499 NO LOS: ICD-10-PCS | Mod: ,,, | Performed by: DERMATOLOGY

## 2022-02-01 PROCEDURE — 17262 PR DESTR MALIG TRUNK,EXTREM 1.1-2 CM: ICD-10-PCS | Mod: ,,, | Performed by: DERMATOLOGY

## 2022-02-01 PROCEDURE — 99499 UNLISTED E&M SERVICE: CPT | Mod: ,,, | Performed by: DERMATOLOGY

## 2022-02-01 PROCEDURE — 17262 DSTRJ MAL LES T/A/L 1.1-2.0: CPT | Mod: ,,, | Performed by: DERMATOLOGY

## 2022-07-18 ENCOUNTER — OFFICE VISIT (OUTPATIENT)
Dept: DERMATOLOGY | Facility: CLINIC | Age: 77
End: 2022-07-18
Payer: COMMERCIAL

## 2022-07-18 VITALS — HEIGHT: 73 IN | RESPIRATION RATE: 18 BRPM | WEIGHT: 179 LBS | BODY MASS INDEX: 23.72 KG/M2

## 2022-07-18 DIAGNOSIS — D48.5 NEOPLASM OF UNCERTAIN BEHAVIOR OF SKIN: ICD-10-CM

## 2022-07-18 DIAGNOSIS — L57.0 AK (ACTINIC KERATOSIS): ICD-10-CM

## 2022-07-18 DIAGNOSIS — L57.8 OTHER SKIN CHANGES DUE TO CHRONIC EXPOSURE TO NONIONIZING RADIATION: Primary | ICD-10-CM

## 2022-07-18 DIAGNOSIS — L82.1 SK (SEBORRHEIC KERATOSIS): ICD-10-CM

## 2022-07-18 DIAGNOSIS — Z85.828 HISTORY OF SCC (SQUAMOUS CELL CARCINOMA) OF SKIN: ICD-10-CM

## 2022-07-18 DIAGNOSIS — Z85.828 HISTORY OF BASAL CELL CARCINOMA (BCC): ICD-10-CM

## 2022-07-18 PROCEDURE — 17004 DESTROY PREMAL LESIONS 15/>: CPT | Mod: ,,, | Performed by: DERMATOLOGY

## 2022-07-18 PROCEDURE — 1101F PT FALLS ASSESS-DOCD LE1/YR: CPT | Mod: ,,, | Performed by: DERMATOLOGY

## 2022-07-18 PROCEDURE — 1159F PR MEDICATION LIST DOCUMENTED IN MEDICAL RECORD: ICD-10-PCS | Mod: ,,, | Performed by: DERMATOLOGY

## 2022-07-18 PROCEDURE — 99213 PR OFFICE/OUTPT VISIT, EST, LEVL III, 20-29 MIN: ICD-10-PCS | Mod: 25,,, | Performed by: DERMATOLOGY

## 2022-07-18 PROCEDURE — 1160F RVW MEDS BY RX/DR IN RCRD: CPT | Mod: ,,, | Performed by: DERMATOLOGY

## 2022-07-18 PROCEDURE — 1160F PR REVIEW ALL MEDS BY PRESCRIBER/CLIN PHARMACIST DOCUMENTED: ICD-10-PCS | Mod: ,,, | Performed by: DERMATOLOGY

## 2022-07-18 PROCEDURE — 88305 TISSUE EXAM BY PATHOLOGIST: CPT | Mod: 26,,, | Performed by: PATHOLOGY

## 2022-07-18 PROCEDURE — 1159F MED LIST DOCD IN RCRD: CPT | Mod: ,,, | Performed by: DERMATOLOGY

## 2022-07-18 PROCEDURE — 3288F FALL RISK ASSESSMENT DOCD: CPT | Mod: ,,, | Performed by: DERMATOLOGY

## 2022-07-18 PROCEDURE — 88305 PATHOLOGY, DERMATOLOGY: ICD-10-PCS | Mod: 26,,, | Performed by: PATHOLOGY

## 2022-07-18 PROCEDURE — 88305 TISSUE EXAM BY PATHOLOGIST: CPT | Mod: SUR | Performed by: DERMATOLOGY

## 2022-07-18 PROCEDURE — 99213 OFFICE O/P EST LOW 20 MIN: CPT | Mod: 25,,, | Performed by: DERMATOLOGY

## 2022-07-18 PROCEDURE — 11102 TANGNTL BX SKIN SINGLE LES: CPT | Mod: 59,,, | Performed by: DERMATOLOGY

## 2022-07-18 PROCEDURE — 11102 PR TANGENTIAL BIOPSY, SKIN, SINGLE LESION: ICD-10-PCS | Mod: 59,,, | Performed by: DERMATOLOGY

## 2022-07-18 PROCEDURE — 1101F PR PT FALLS ASSESS DOC 0-1 FALLS W/OUT INJ PAST YR: ICD-10-PCS | Mod: ,,, | Performed by: DERMATOLOGY

## 2022-07-18 PROCEDURE — 3288F PR FALLS RISK ASSESSMENT DOCUMENTED: ICD-10-PCS | Mod: ,,, | Performed by: DERMATOLOGY

## 2022-07-18 PROCEDURE — 17004 PR DESTRUCTION, PREMALIGNANT LESIONS; 15 OR MORE LESIONS: ICD-10-PCS | Mod: ,,, | Performed by: DERMATOLOGY

## 2022-07-18 RX ORDER — TRIAMCINOLONE ACETONIDE 0.25 MG/G
CREAM TOPICAL
Qty: 80 G | Refills: 1 | Status: SHIPPED | OUTPATIENT
Start: 2022-07-18

## 2022-07-18 NOTE — PATIENT INSTRUCTIONS
Sunscreen Recommendations  I recommended a broad spectrum sunscreen with a SPF of 30 or higher that is water-resistant. SPF 30 sunscreens block approximately 97 percent of the sun's harmful rays.   Sunscreens should be applied at least 15 minutes prior to expected sun exposure and then every 90 minutes after that as long as sun exposure continues.   If swimming or exercising sunscreen should be reapplied every 45 minutes to an hour after getting wet or sweating.  One ounce, or the equivalent of a shot glass full of sunscreen, is adequate to protect the skin not covered by a bathing suit.   I also recommended a lip balm with a sunscreen as well.   Healthy Sun Protective Behaviors  Sun protective clothing can be used in lieu of sunscreen but must be worn the entire time you are exposed to the sun's rays.  Seek shade between 10 a.m. and 2 p.m.  Use extra caution near water, snow, or sand as they reflect sun rays  Avoid tanning beds and consider sunless self-tanning products instead  Perform monthly self skin exams     Cryotherapy  There will likely be a blister.   Clean the area daily with dial antibacterial soap and water.   Apply vaseline as needed.   The area will take 1-2 weeks to heal.  Biopsy Site Care  Starting tomorrow you may shower and wash the area with dial antibacterial soap  Pat dry and apply vaseline and a bandaid  Perform this routine for three days  The area will be irritated, sore, slightly red, and may itch, sting, or burn  Do not apply make-up to the area until it is healed  There will be a scar  The area will take 1-2 weeks to heal  No soaking in the tub or hot tub for one week

## 2022-07-18 NOTE — PROGRESS NOTES
[facility]   Caryl Watson MD    Patient Name: Tl Martinez  Patient YOB: 1945   Date of Service: 7/18/22    CC: Full Skin Exam    HPI: Tl Martinez is a 77 y.o. male presents today for a full skin exam.  Patient was last seen 2/1/22 and dermatologic history includes BCC and SCC. Patient is concerned today about a rash located on the right forehead.  It has been present for 4 month(s). It has not been treated in the past.    Past Medical History:   Diagnosis Date    Atrial fibrillation     HTN (hypertension)      Past Surgical History:   Procedure Laterality Date    BASAL CELL CARCINOMA EXCISION      EXCISION OF SQUAMOUS CELL CARCINOMA       Review of patient's allergies indicates:  No Known Allergies    Current Outpatient Medications:     amLODIPine (NORVASC) 5 MG tablet, Take 5 mg by mouth once daily., Disp: , Rfl:     atorvastatin (LIPITOR) 10 MG tablet, Take 10 mg by mouth once daily., Disp: , Rfl:     benzonatate (TESSALON) 100 MG capsule, TAKE 2 CAPSULES BY MOUTH THREE TIMES DAILY AS NEEDED FOR COUGH, Disp: , Rfl:     celecoxib (CELEBREX) 100 MG capsule, Take 100 mg by mouth once daily., Disp: , Rfl:     ELIQUIS 2.5 mg Tab, , Disp: , Rfl:     HYDROcodone-acetaminophen (NORCO) 7.5-325 mg per tablet, TAKE 1/2 TO 1 TABLET BY MOUTH DAILY AS NEEDED, Disp: , Rfl:     ketorolac 0.5% (ACULAR) 0.5 % Drop, , Disp: , Rfl:     montelukast (SINGULAIR) 10 mg tablet, Take 10 mg by mouth 2 (two) times daily., Disp: , Rfl:     ofloxacin (OCUFLOX) 0.3 % ophthalmic solution, , Disp: , Rfl:     omeprazole (PRILOSEC) 20 MG capsule, , Disp: , Rfl:     prednisoLONE acetate (PRED FORTE) 1 % DrpS, , Disp: , Rfl:     sertraline (ZOLOFT) 50 MG tablet, , Disp: , Rfl:     SPIRIVA WITH HANDIHALER 18 mcg inhalation capsule, , Disp: , Rfl:     terbinafine HCL (LAMISIL) 250 mg tablet, Take 250 mg by mouth once daily., Disp: , Rfl:     triamcinolone acetonide 0.025% (KENALOG) 0.025 %  cream, Apply to AA on forehead BID tapering with improvement, Disp: 80 g, Rfl: 1    ROS: A focused review of systems was obtained and negative.     Exam: A full skin exam was performed including scalp, hair, face, neck, chest, back, abdomen, right arm, left arm, right hand, left hand, nails, right leg, and left leg.  All areas examined were normal expect as per below in assessment and plan.  General Appearance of the patient is well developed and well nourished.  Orientation: alert and oriented x 3.  Mood and affect: pleasant.    Assessment:   The primary encounter diagnosis was Other skin changes due to chronic exposure to nonionizing radiation. Diagnoses of SK (seborrheic keratosis), AK (actinic keratosis), History of basal cell carcinoma (BCC), History of SCC (squamous cell carcinoma) of skin, and Neoplasm of uncertain behavior of skin were also pertinent to this visit.    Plan:   Medications Ordered This Encounter   Medications    triamcinolone acetonide 0.025% (KENALOG) 0.025 % cream     Sig: Apply to AA on forehead BID tapering with improvement     Dispense:  80 g     Refill:  1        History of non-melanoma skin cancer (Z85.828)  - Well healed scar with NER  Associated diagnosis: Medical surveillance following completed treatment    Plan: Monitoring.    Actinic Keratoses(L57.0)  - Erythematous patches and papules with hyperkeratotic scale distributed on the right hand, right forearm,left ear,left hand and left forearm.    Plan: Counseling.  I counseled the patient regarding the following:  Skin Care: Sun protective clothing and broad spectrum sunscreen can prevent the formation of Actinic  Keratoses. AKs can resolve with cryotherapy, photodynamic therapy, imiquimod, topical 5-FU.  Expectations: Actinic Keratoses are precancerous proliferations that occur within sun damaged skin. If untreated,  a small subset of AKs can develop into Squamous Cell Carcinoma.  Contact Office if: If AKs fail to resolve despite  treatment, or if you develop a side effect from therapy, such as  unbearable crusting, scabbing, redness and tenderness.    Plan: Liquid Nitrogen.  A total of 20 lesions were treated with liquid nitrogen for 2 freeze-thaw cycles lasting 5 seconds, located on the above locations.   The patient's consent was obtained including but not limited to risks of crusting, scabbing,  blistering, scarring, darker or lighter pigmentary change, recurrence, incomplete removal and infection.      Seborrheic Keratosis (L82.1)  - Stuck-on, warty, greasy brown papule with pseudo-horn cysts scattered on the trunk and extremities    Plan: Counseling.  I counseled the patient regarding the following:  Skin Care: Seborrheic Keratoses are benign. No treatment is necessary.  Expectations: Seborrheic Keratoses are benign warty growths. Patients get more of them as they age    Plan: Reassurance    Other Skin Changes Due to Chronic Exposure of Nonionizing Radiation (L57.8)    Plan: Monitoring.     Plan: Sunscreen Recommendations.  I recommended a broad spectrum sunscreen with a SPF of 30 or higher. I explained that SPF 30 sunscreens block approximately 97 percent of the  sun's harmful rays. Sunscreens should be applied at least 15 minutes prior to expected sun exposure and then every 2 hours after that as long as  sun exposure continues. If swimming or exercising sunscreen should be reapplied every 45 minutes to an hour after getting wet or sweating. One  ounce, or the equivalent of a shot glass full of sunscreen, is adequate to protect the skin not covered by a bathing suit. I also recommended a lip  balm with a sunscreen as well. Sun protective clothing can be used in lieu of sunscreen but must be worn the entire time you are exposed to the  sun's rays.    Neoplasm of Uncertain Behavior (D48.5)  - pearly papule located on the left upper distal arm  Ddx includes: BCC    Plan: Counseling.  I counseled the patient regarding the  following:  Instructions: Neoplasms of Uncertain Behavior can be observed, biopsied or surgically removed depending on the  level of clinical suspicion.  Instructions: Neoplasms of Uncertain Behavior can be observed, biopsied or surgically removed depending on the  level of clinical suspicion.  Contact Office if: patient develops any new lesions that fail to heal, ulcerate or bleed.    Plan: Biopsy by Shave Method.  Location (1): left upper distal arm  Written consent was obtained and risks were reviewed including but not  limited to scarring, infection, bleeding, scabbing, incomplete removal, nerve damage and allergy to anesthesia.  The area was prepped with Chloraprep. Local anesthesia was obtained with approximately 0.5cc of 1% lidocaine  with epinephrine. A biopsy by shave method to the level of the dermis (sent for H and E) was performed using  a Dermablade on the above location. Aluminum chloride was used for hemostasis. Following the biopsy  Petrolatum and a bandage were applied. Patient will be notified of biopsy results. However, patient instructed to  call the office if not contacted within 2 weeks.    Eczema   - eczematous papules on the left forehead    Status: Inadequately controlled     Plan: Counseling  I counseled the patient regarding the following:  Skin care: Patient should bathe using lukewarm water with a mild cleanser and moisturize immediately after. Emollients should be applied at least 2-3 times daily. Avoid scented detergents or fabric softeners. Keep fingernails short. Avoid excessive hand washing.  Expectations: The patient is aware that eczema is chronic in nature and can improve with moisturizers and topical steroids and worsen with stress, scented soaps, detergents, scratching, dry skin, changes in weather and skin infections.  Contact office if: Eczema worsens or fails to improve despite several weeks of treatment; patient develops skin infections (such as: yellow honey colored crusts  or cold sores).    I recommended the following:  Moisturizers      Follow up in about 6 months (around 1/18/2023) for Skin Check .    Caryl Watson MD

## 2022-07-20 LAB
ESTROGEN SERPL-MCNC: NORMAL PG/ML
INSULIN SERPL-ACNC: NORMAL U[IU]/ML
LAB AP GROSS DESCRIPTION: NORMAL
LAB AP LABORATORY NOTES: NORMAL
LAB AP SPEC A DDX: NORMAL
LAB AP SPEC A MORPHOLOGY: NORMAL
LAB AP SPEC A PROCEDURE: NORMAL
T3RU NFR SERPL: NORMAL %

## 2022-08-02 ENCOUNTER — PROCEDURE VISIT (OUTPATIENT)
Dept: DERMATOLOGY | Facility: CLINIC | Age: 77
End: 2022-08-02
Payer: COMMERCIAL

## 2022-08-02 VITALS
HEIGHT: 73 IN | HEART RATE: 71 BPM | SYSTOLIC BLOOD PRESSURE: 175 MMHG | RESPIRATION RATE: 18 BRPM | DIASTOLIC BLOOD PRESSURE: 93 MMHG | WEIGHT: 179 LBS | BODY MASS INDEX: 23.72 KG/M2

## 2022-08-02 DIAGNOSIS — C44.619 BASAL CELL CARCINOMA (BCC) OF SKIN OF LEFT UPPER EXTREMITY INCLUDING SHOULDER: Primary | ICD-10-CM

## 2022-08-02 PROCEDURE — 88305 TISSUE EXAM BY PATHOLOGIST: CPT | Mod: 26,,, | Performed by: PATHOLOGY

## 2022-08-02 PROCEDURE — 12032 PR LAYR CLOS WND TRUNK,ARM,LEG 2.6-7.5 CM: ICD-10-PCS | Mod: 51,,, | Performed by: DERMATOLOGY

## 2022-08-02 PROCEDURE — 12032 INTMD RPR S/A/T/EXT 2.6-7.5: CPT | Mod: 51,,, | Performed by: DERMATOLOGY

## 2022-08-02 PROCEDURE — 88305 PATHOLOGY, DERMATOLOGY: ICD-10-PCS | Mod: 26,,, | Performed by: PATHOLOGY

## 2022-08-02 PROCEDURE — 88305 TISSUE EXAM BY PATHOLOGIST: CPT | Mod: SUR | Performed by: DERMATOLOGY

## 2022-08-02 PROCEDURE — 99499 UNLISTED E&M SERVICE: CPT | Mod: ,,, | Performed by: DERMATOLOGY

## 2022-08-02 PROCEDURE — 11602 PR EXC SKIN MALIG 1.1-2 CM TRUNK,ARM,LEG: ICD-10-PCS | Mod: ,,, | Performed by: DERMATOLOGY

## 2022-08-02 PROCEDURE — 11602 EXC TR-EXT MAL+MARG 1.1-2 CM: CPT | Mod: ,,, | Performed by: DERMATOLOGY

## 2022-08-02 PROCEDURE — 99499 NO LOS: ICD-10-PCS | Mod: ,,, | Performed by: DERMATOLOGY

## 2022-08-02 NOTE — PROGRESS NOTES
[facility]   Caryl Watson MD    Patient Name: Tl Martinez  Patient YOB: 1945   Date of Service: 8/2/22    CC: Excision    HPI: Tl Martinez is a 77 y.o. male here today for excision of BCC, located on the left upper distal arm.      Past Medical History:   Diagnosis Date    Atrial fibrillation     HTN (hypertension)      Past Surgical History:   Procedure Laterality Date    BASAL CELL CARCINOMA EXCISION      EXCISION OF SQUAMOUS CELL CARCINOMA       Review of patient's allergies indicates:  No Known Allergies    Current Outpatient Medications:     amLODIPine (NORVASC) 5 MG tablet, Take 5 mg by mouth once daily., Disp: , Rfl:     atorvastatin (LIPITOR) 10 MG tablet, Take 10 mg by mouth once daily., Disp: , Rfl:     benzonatate (TESSALON) 100 MG capsule, TAKE 2 CAPSULES BY MOUTH THREE TIMES DAILY AS NEEDED FOR COUGH, Disp: , Rfl:     celecoxib (CELEBREX) 100 MG capsule, Take 100 mg by mouth once daily., Disp: , Rfl:     ELIQUIS 2.5 mg Tab, , Disp: , Rfl:     HYDROcodone-acetaminophen (NORCO) 7.5-325 mg per tablet, TAKE 1/2 TO 1 TABLET BY MOUTH DAILY AS NEEDED, Disp: , Rfl:     ketorolac 0.5% (ACULAR) 0.5 % Drop, , Disp: , Rfl:     montelukast (SINGULAIR) 10 mg tablet, Take 10 mg by mouth 2 (two) times daily., Disp: , Rfl:     ofloxacin (OCUFLOX) 0.3 % ophthalmic solution, , Disp: , Rfl:     omeprazole (PRILOSEC) 20 MG capsule, , Disp: , Rfl:     prednisoLONE acetate (PRED FORTE) 1 % DrpS, , Disp: , Rfl:     sertraline (ZOLOFT) 50 MG tablet, , Disp: , Rfl:     SPIRIVA WITH HANDIHALER 18 mcg inhalation capsule, , Disp: , Rfl:     terbinafine HCL (LAMISIL) 250 mg tablet, Take 250 mg by mouth once daily., Disp: , Rfl:     triamcinolone acetonide 0.025% (KENALOG) 0.025 % cream, Apply to AA on forehead BID tapering with improvement, Disp: 80 g, Rfl: 1    ROS: A focused review of systems was obtained and negative.     Exam: A focused skin exam was performed and  the biopsy site was identified.  General Appearance of the patient is well developed and well nourished.  Orientation: alert and oriented x 3.  Mood and affect: pleasant.    Vitals:    08/02/22 1001   BP: (!) 175/93   Pulse: 71   Resp: 18       Assessment:   The encounter diagnosis was Basal cell carcinoma (BCC) of skin of left upper extremity including shoulder.    Plan:  Excision  Accession Number: [id number]  Biopsy Photograph Reviewed: Yes    Procedure Note    Location (A): Left upper distal arm  Preop Size: 1.2 x 0.8  Margin: 0.4 cm  Total Excised Diameter: 2.0 x  cm  Procedure: Excision - Elliptical  Anesthesia: local infiltration-1% lidocaine with epinephrine (12 cc)  Estimated Blood Loss: minimal  Complications: none    Repair Type: Intermediate  Repair Length: 4.1    Consent was obtained from the patient. The risks and benefits to therapy were discussed in detail. Specifically, the risks of infection, scarring, bleeding, prolonged wound healing, incomplete removal, allergy to anesthesia, nerve injury and recurrence were addressed. Prior to the procedure, the treatment site was clearly identified and confirmed by the patient. All components of Universal Protocol/PAUSE Rule completed.    Procedure: The patient was placed in a comfortable position exposing the surgical site. The area was prepped with Hibiclens and draped in the usual fashion. An elliptical excision was performed, on the above listed location The margin was drawn around the clinically apparent lesion. An elliptical shape was then drawn on the skin incorporating the lesion and margins. Incisions were then made along these lines to the appropriate tissue plane and the lesion was extirpated. A 15 blade was used. The lesion was excised to the layer of the adipose tissue, removed and sent to pathology for processing and histologic evaluation.    Intermediate layered repair was performed because closure of the subcutaneous tissue and superficial  non-muscular fascia was required, because damaged skin surrounding defect made closure difficult, because extensive undermining required, and because Burow's triangles were required. Undermining was performed with blunt dissection. Hemostasis was achieved with electrocautery. The subcutaneous tissue and dermis were closed with 3-0 Vicryl (interrupted). Epidermal closure was achieved with 4-0 Ethilon(interrupted). Petrolatum + dry sterile dressing were applied. I reviewed with the patient in detail post-care instructions. Patient is not to engage in any heavy lifting, exercise, or swimming for the next 14 days. Should the patient develop any fevers, chills, bleeding, severe pain patient will contact the office immediately. Suture removal in 10 days.           Follow up in about 10 days (around 8/12/2022) for Suture removal.    Caryl Watson MD

## 2022-08-12 ENCOUNTER — CLINICAL SUPPORT (OUTPATIENT)
Dept: DERMATOLOGY | Facility: CLINIC | Age: 77
End: 2022-08-12
Payer: COMMERCIAL

## 2022-08-12 DIAGNOSIS — Z48.02 ENCOUNTER FOR REMOVAL OF SUTURES: Primary | ICD-10-CM

## 2022-08-12 NOTE — PROGRESS NOTES
[facility]   Caryl Watson MD    Patient Name: Tl Martinez  Patient YOB: 1945   Date of Service: 8/12/22    CC: Suture removal    HPI: Tl Martinez is a 77 y.o. male here today for suture removal on the left upper distal arm.  The area is healing well.  Patient denies fever, chills, puss, or redness to the area.    Past Medical History:   Diagnosis Date    Atrial fibrillation     HTN (hypertension)      Past Surgical History:   Procedure Laterality Date    BASAL CELL CARCINOMA EXCISION      EXCISION OF SQUAMOUS CELL CARCINOMA       Review of patient's allergies indicates:  No Known Allergies    Current Outpatient Medications:     amLODIPine (NORVASC) 5 MG tablet, Take 5 mg by mouth once daily., Disp: , Rfl:     atorvastatin (LIPITOR) 10 MG tablet, Take 10 mg by mouth once daily., Disp: , Rfl:     benzonatate (TESSALON) 100 MG capsule, TAKE 2 CAPSULES BY MOUTH THREE TIMES DAILY AS NEEDED FOR COUGH, Disp: , Rfl:     celecoxib (CELEBREX) 100 MG capsule, Take 100 mg by mouth once daily., Disp: , Rfl:     ELIQUIS 2.5 mg Tab, , Disp: , Rfl:     HYDROcodone-acetaminophen (NORCO) 7.5-325 mg per tablet, TAKE 1/2 TO 1 TABLET BY MOUTH DAILY AS NEEDED, Disp: , Rfl:     ketorolac 0.5% (ACULAR) 0.5 % Drop, , Disp: , Rfl:     montelukast (SINGULAIR) 10 mg tablet, Take 10 mg by mouth 2 (two) times daily., Disp: , Rfl:     ofloxacin (OCUFLOX) 0.3 % ophthalmic solution, , Disp: , Rfl:     omeprazole (PRILOSEC) 20 MG capsule, , Disp: , Rfl:     prednisoLONE acetate (PRED FORTE) 1 % DrpS, , Disp: , Rfl:     sertraline (ZOLOFT) 50 MG tablet, , Disp: , Rfl:     SPIRIVA WITH HANDIHALER 18 mcg inhalation capsule, , Disp: , Rfl:     terbinafine HCL (LAMISIL) 250 mg tablet, Take 250 mg by mouth once daily., Disp: , Rfl:     triamcinolone acetonide 0.025% (KENALOG) 0.025 % cream, Apply to AA on forehead BID tapering with improvement, Disp: 80 g, Rfl: 1      Exam: A focused skin  exam was performed. All areas examined were normal except as mentioned in the assessment and plan below.  General Appearance of the patient is well developed and well nourished.  Orientation: alert and oriented x 3.  Mood and affect: pleasant.    Assessment:   There were no encounter diagnoses.    Plan:   Suture Removal (Global Period)  Body Locations: left upper distal arm  I reviewed the pathology results with the patient in detail.  The examination of the site was clean, dry and intact. Sutures were removed.  Vaseline applied.    I counseled the patient regarding the following:  Expectations: Sutured wounds tend to have 50% of the strength of normal skin at the time of suture removal. Try avoiding rigorous exercise or lifting greater than 10 pounds.  Contact office if: you develop pain, redness, tenderness or pus at the surgical site.    A culture was not obtained from the area           Follow up if symptoms worsen or fail to improve.    Caryl Watson MD

## 2023-01-18 ENCOUNTER — OFFICE VISIT (OUTPATIENT)
Dept: DERMATOLOGY | Facility: CLINIC | Age: 78
End: 2023-01-18
Payer: COMMERCIAL

## 2023-01-18 VITALS — BODY MASS INDEX: 23.72 KG/M2 | HEIGHT: 73 IN | WEIGHT: 179 LBS

## 2023-01-18 DIAGNOSIS — L82.1 SEBORRHEIC KERATOSES: ICD-10-CM

## 2023-01-18 DIAGNOSIS — L57.0 AK (ACTINIC KERATOSIS): ICD-10-CM

## 2023-01-18 DIAGNOSIS — D48.9 NEOPLASM OF UNCERTAIN BEHAVIOR: ICD-10-CM

## 2023-01-18 DIAGNOSIS — L57.8 OTHER SKIN CHANGES DUE TO CHRONIC EXPOSURE TO NONIONIZING RADIATION: Primary | ICD-10-CM

## 2023-01-18 PROCEDURE — 11102 TANGNTL BX SKIN SINGLE LES: CPT | Mod: XS,,, | Performed by: DERMATOLOGY

## 2023-01-18 PROCEDURE — 88305 TISSUE EXAM BY PATHOLOGIST: CPT | Mod: TC,SUR | Performed by: DERMATOLOGY

## 2023-01-18 PROCEDURE — 1159F MED LIST DOCD IN RCRD: CPT | Mod: ,,, | Performed by: DERMATOLOGY

## 2023-01-18 PROCEDURE — 99213 PR OFFICE/OUTPT VISIT, EST, LEVL III, 20-29 MIN: ICD-10-PCS | Mod: 25,,, | Performed by: DERMATOLOGY

## 2023-01-18 PROCEDURE — 11102 PR TANGENTIAL BIOPSY, SKIN, SINGLE LESION: ICD-10-PCS | Mod: XS,,, | Performed by: DERMATOLOGY

## 2023-01-18 PROCEDURE — 88305 TISSUE EXAM BY PATHOLOGIST: CPT | Mod: 26,,, | Performed by: PATHOLOGY

## 2023-01-18 PROCEDURE — 1160F RVW MEDS BY RX/DR IN RCRD: CPT | Mod: ,,, | Performed by: DERMATOLOGY

## 2023-01-18 PROCEDURE — 88305 PATHOLOGY, DERMATOLOGY: ICD-10-PCS | Mod: 26,,, | Performed by: PATHOLOGY

## 2023-01-18 PROCEDURE — 1160F PR REVIEW ALL MEDS BY PRESCRIBER/CLIN PHARMACIST DOCUMENTED: ICD-10-PCS | Mod: ,,, | Performed by: DERMATOLOGY

## 2023-01-18 PROCEDURE — 17004 PR DESTRUCTION, PREMALIGNANT LESIONS; 15 OR MORE LESIONS: ICD-10-PCS | Mod: ,,, | Performed by: DERMATOLOGY

## 2023-01-18 PROCEDURE — 1159F PR MEDICATION LIST DOCUMENTED IN MEDICAL RECORD: ICD-10-PCS | Mod: ,,, | Performed by: DERMATOLOGY

## 2023-01-18 PROCEDURE — 17004 DESTROY PREMAL LESIONS 15/>: CPT | Mod: ,,, | Performed by: DERMATOLOGY

## 2023-01-18 PROCEDURE — 99213 OFFICE O/P EST LOW 20 MIN: CPT | Mod: 25,,, | Performed by: DERMATOLOGY

## 2023-01-18 NOTE — PROGRESS NOTES
[facility]   Caryl Watson MD    Patient Name: Tl Martinez  Patient YOB: 1945   Date of Service: 1/18/23    CC: Full Skin Exam    HPI: Tl Martinez is a 78 y.o. male presents today for a full skin exam.  Patient was last seen 08/2022  and dermatologic history includes BCC amd AKs. Patient is concerned today about lesions located on the arms.  It has been present for 4 year(s). It has been treated in the past.  Patient is also concerned about lesions on the face.    Past Medical History:   Diagnosis Date    Atrial fibrillation     HTN (hypertension)      Past Surgical History:   Procedure Laterality Date    BASAL CELL CARCINOMA EXCISION      EXCISION OF SQUAMOUS CELL CARCINOMA       Review of patient's allergies indicates:  No Known Allergies    Current Outpatient Medications:     amLODIPine (NORVASC) 5 MG tablet, Take 5 mg by mouth once daily., Disp: , Rfl:     atorvastatin (LIPITOR) 10 MG tablet, Take 10 mg by mouth once daily., Disp: , Rfl:     benzonatate (TESSALON) 100 MG capsule, TAKE 2 CAPSULES BY MOUTH THREE TIMES DAILY AS NEEDED FOR COUGH, Disp: , Rfl:     celecoxib (CELEBREX) 100 MG capsule, Take 100 mg by mouth once daily., Disp: , Rfl:     ELIQUIS 2.5 mg Tab, , Disp: , Rfl:     HYDROcodone-acetaminophen (NORCO) 7.5-325 mg per tablet, TAKE 1/2 TO 1 TABLET BY MOUTH DAILY AS NEEDED, Disp: , Rfl:     ketorolac 0.5% (ACULAR) 0.5 % Drop, , Disp: , Rfl:     montelukast (SINGULAIR) 10 mg tablet, Take 10 mg by mouth 2 (two) times daily., Disp: , Rfl:     ofloxacin (OCUFLOX) 0.3 % ophthalmic solution, , Disp: , Rfl:     omeprazole (PRILOSEC) 20 MG capsule, , Disp: , Rfl:     prednisoLONE acetate (PRED FORTE) 1 % DrpS, , Disp: , Rfl:     sertraline (ZOLOFT) 50 MG tablet, , Disp: , Rfl:     SPIRIVA WITH HANDIHALER 18 mcg inhalation capsule, , Disp: , Rfl:     terbinafine HCL (LAMISIL) 250 mg tablet, Take 250 mg by mouth once daily., Disp: , Rfl:     triamcinolone acetonide  0.025% (KENALOG) 0.025 % cream, Apply to AA on forehead BID tapering with improvement, Disp: 80 g, Rfl: 1    ROS: A focused review of systems was obtained and negative.     Exam: A full skin exam was performed including scalp, hair, face, neck, chest, back, abdomen, right arm, left arm, right hand, left hand, nails, right leg, and left leg.  All areas examined were normal expect as per below in assessment and plan.  General Appearance of the patient is well developed and well nourished.  Orientation: alert and oriented x 3.  Mood and affect: pleasant.    Assessment:   The primary encounter diagnosis was Other skin changes due to chronic exposure to nonionizing radiation. Diagnoses of AK (actinic keratosis), Neoplasm of uncertain behavior, and Seborrheic keratoses were also pertinent to this visit.    Plan:      Other Skin Changes Due to Chronic Exposure of Nonionizing Radiation (L57.8)    Plan: Monitoring.     Plan: Sunscreen Recommendations.  I recommended a broad spectrum sunscreen with a SPF of 30 or higher. I explained that SPF 30 sunscreens block approximately 97 percent of the  sun's harmful rays. Sunscreens should be applied at least 15 minutes prior to expected sun exposure and then every 2 hours after that as long as  sun exposure continues. If swimming or exercising sunscreen should be reapplied every 45 minutes to an hour after getting wet or sweating. One  ounce, or the equivalent of a shot glass full of sunscreen, is adequate to protect the skin not covered by a bathing suit. I also recommended a lip  balm with a sunscreen as well. Sun protective clothing can be used in lieu of sunscreen but must be worn the entire time you are exposed to the  sun's rays.    Actinic Keratoses(L57.0)  - Erythematous patches and papules with hyperkeratotic scale distributed on the bilateral arms, face and chest .    Plan: Counseling.  I counseled the patient regarding the following:  Skin Care: Sun protective clothing and  broad spectrum sunscreen can prevent the formation of Actinic  Keratoses. AKs can resolve with cryotherapy, photodynamic therapy, imiquimod, topical 5-FU.  Expectations: Actinic Keratoses are precancerous proliferations that occur within sun damaged skin. If untreated,  a small subset of AKs can develop into Squamous Cell Carcinoma.  Contact Office if: If AKs fail to resolve despite treatment, or if you develop a side effect from therapy, such as  unbearable crusting, scabbing, redness and tenderness.    Plan: Liquid Nitrogen.  A total of 17 lesions were treated with liquid nitrogen for 2 freeze-thaw cycles lasting 5 seconds, located on the above locations.   The patient's consent was obtained including but not limited to risks of crusting, scabbing,  blistering, scarring, darker or lighter pigmentary change, recurrence, incomplete removal and infection.    Neoplasm of Uncertain Behavior (D48.5)  - pearly patch located on the left anterior shoulder  Ddx includes: BCC    Plan: Counseling.  I counseled the patient regarding the following:  Instructions: Neoplasms of Uncertain Behavior can be observed, biopsied or surgically removed depending on the  level of clinical suspicion.  Instructions: Neoplasms of Uncertain Behavior can be observed, biopsied or surgically removed depending on the  level of clinical suspicion.  Contact Office if: patient develops any new lesions that fail to heal, ulcerate or bleed.    Plan: Biopsy by Shave Method.  Location (1): left anterior shoulder  Written consent was obtained and risks were reviewed including but not  limited to scarring, infection, bleeding, scabbing, incomplete removal, nerve damage and allergy to anesthesia.  The area was prepped with Chloraprep. Local anesthesia was obtained with approximately 0.5cc of 1% lidocaine  with epinephrine. A biopsy by shave method to the level of the dermis (sent for H and E) was performed using  a Dermablade on the above location. Aluminum  chloride was used for hemostasis. Following the biopsy  Petrolatum and a bandage were applied. Patient will be notified of biopsy results. However, patient instructed to  call the office if not contacted within 2 weeks.    Seborrheic Keratosis (L82.1)  - Stuck-on, warty, greasy brown papule with pseudo-horn cysts scattered on the trunk and extremities    Plan: Counseling.  I counseled the patient regarding the following:  Skin Care: Seborrheic Keratoses are benign. No treatment is necessary.  Expectations: Seborrheic Keratoses are benign warty growths. Patients get more of them as they age    Plan: Reassurance      Follow up in about 6 months (around 7/18/2023) for FSE.    Caryl Watson MD

## 2023-02-06 NOTE — PATIENT INSTRUCTIONS
ED&C  Starting tomorrow you may change the bandage.  Clean with Dial antibacterial soap and pat dry.  Apply Vaseline or Mupirocin ointment, then apply clean bandage.   Repeat above steps for 7 days.  On day 8, wash with Dial antibacterial soap, apply Vaseline or Mupirocin ointment then leave open.   No swimming, soaking in bath tub, or hot tub until it is scabbed over.   Only Tylenol is recommended for pain. No Advil, Ibuprofen, Aleve, Motrin, or Aspirin.  There will be a scar.

## 2023-02-07 ENCOUNTER — PROCEDURE VISIT (OUTPATIENT)
Dept: DERMATOLOGY | Facility: CLINIC | Age: 78
End: 2023-02-07
Payer: COMMERCIAL

## 2023-02-07 VITALS
DIASTOLIC BLOOD PRESSURE: 94 MMHG | WEIGHT: 179 LBS | SYSTOLIC BLOOD PRESSURE: 165 MMHG | HEART RATE: 93 BPM | HEIGHT: 73 IN | BODY MASS INDEX: 23.72 KG/M2

## 2023-02-07 DIAGNOSIS — C44.619 BCC (BASAL CELL CARCINOMA), SHOULDER, LEFT: Primary | ICD-10-CM

## 2023-02-07 PROCEDURE — 99499 UNLISTED E&M SERVICE: CPT | Mod: ,,, | Performed by: DERMATOLOGY

## 2023-02-07 PROCEDURE — 17264 PR DESTR MALIG TRUNK,EXTREM 3.1-4 CM: ICD-10-PCS | Mod: ,,, | Performed by: DERMATOLOGY

## 2023-02-07 PROCEDURE — 99499 NO LOS: ICD-10-PCS | Mod: ,,, | Performed by: DERMATOLOGY

## 2023-02-07 PROCEDURE — 17264 DSTRJ MAL LES T/A/L 3.1-4.0: CPT | Mod: ,,, | Performed by: DERMATOLOGY

## 2023-02-07 NOTE — PROGRESS NOTES
[facility]   Caryl Watson MD    Patient Name: Tl Martinez  Patient YOB: 1945   Date of Service: 2/7/23    CC: EDC    HPI: Tl Martinez is a 78 y.o. male here today for EDC of BCC, located on the left anterior shoulder.      Past Medical History:   Diagnosis Date    Atrial fibrillation     HTN (hypertension)      Past Surgical History:   Procedure Laterality Date    BASAL CELL CARCINOMA EXCISION      EXCISION OF SQUAMOUS CELL CARCINOMA       Review of patient's allergies indicates:  No Known Allergies    Current Outpatient Medications:     amLODIPine (NORVASC) 5 MG tablet, Take 5 mg by mouth once daily., Disp: , Rfl:     atorvastatin (LIPITOR) 10 MG tablet, Take 10 mg by mouth once daily., Disp: , Rfl:     benzonatate (TESSALON) 100 MG capsule, TAKE 2 CAPSULES BY MOUTH THREE TIMES DAILY AS NEEDED FOR COUGH, Disp: , Rfl:     celecoxib (CELEBREX) 100 MG capsule, Take 100 mg by mouth once daily., Disp: , Rfl:     ELIQUIS 2.5 mg Tab, , Disp: , Rfl:     HYDROcodone-acetaminophen (NORCO) 7.5-325 mg per tablet, TAKE 1/2 TO 1 TABLET BY MOUTH DAILY AS NEEDED, Disp: , Rfl:     ketorolac 0.5% (ACULAR) 0.5 % Drop, , Disp: , Rfl:     montelukast (SINGULAIR) 10 mg tablet, Take 10 mg by mouth 2 (two) times daily., Disp: , Rfl:     ofloxacin (OCUFLOX) 0.3 % ophthalmic solution, , Disp: , Rfl:     omeprazole (PRILOSEC) 20 MG capsule, , Disp: , Rfl:     prednisoLONE acetate (PRED FORTE) 1 % DrpS, , Disp: , Rfl:     sertraline (ZOLOFT) 50 MG tablet, , Disp: , Rfl:     SPIRIVA WITH HANDIHALER 18 mcg inhalation capsule, , Disp: , Rfl:     terbinafine HCL (LAMISIL) 250 mg tablet, Take 250 mg by mouth once daily., Disp: , Rfl:     triamcinolone acetonide 0.025% (KENALOG) 0.025 % cream, Apply to AA on forehead BID tapering with improvement, Disp: 80 g, Rfl: 1    ROS: A focused review of systems was obtained and negative.     Exam: A focused skin exam was performed and the biopsy site was  identified.  General Appearance of the patient is well developed and well nourished.  Orientation: alert and oriented x 3.  Mood and affect: pleasant.    Vitals:    02/07/23 0923   BP: (!) 165/94   Pulse: 93       Assessment:   The encounter diagnosis was BCC (basal cell carcinoma), shoulder, left.    Plan: Curettage and Destruction  Location: Left anterior shoulder    Accession Number: [id number]    Initial Size: 3.0 x 2.5 cm  Final Size: 3.2 x 2.7 cm    Consent was obtained from the patient. The risks, benefits and alternatives to therapy were discussed in detail. Specifically, the risks of infection, scarring, bleeding, prolonged wound healing, nerve injury, incomplete removal, allergy to anesthesia and recurrence were addressed. Alternatives to ED&C, such as: surgical removal and XRT were also discussed. Prior to the procedure, the treatment site was clearly identified and confirmed by the patient and biopsy photo. All components of Universal Protocol/PAUSE Rule completed.    The photograph obtained at the biopsy was reviewed prior to proceeding with the procedure. The lesion was treated with Curettage and Destruction. The area was prepped with Chloraprep. Local anesthesia was achieved with 6 cc of 1% lidocaine with epinephrine. The tumor was removed by curettage and electrodesiccation. 3 cycles were required to remove all clinically apparent tumor. The size of the lesion after curettage was as listed above. The wound was cleaned, and a pressure dressing was applied. The patient received detailed post-op instructions.           Follow up if symptoms worsen or fail to improve, for FSE already scheduled.    Caryl Watson MD

## 2023-07-18 ENCOUNTER — OFFICE VISIT (OUTPATIENT)
Dept: DERMATOLOGY | Facility: CLINIC | Age: 78
End: 2023-07-18
Payer: COMMERCIAL

## 2023-07-18 DIAGNOSIS — L82.1 SK (SEBORRHEIC KERATOSIS): ICD-10-CM

## 2023-07-18 DIAGNOSIS — Z85.828 HISTORY OF NONMELANOMA SKIN CANCER: ICD-10-CM

## 2023-07-18 DIAGNOSIS — L57.8 OTHER SKIN CHANGES DUE TO CHRONIC EXPOSURE TO NONIONIZING RADIATION: Primary | ICD-10-CM

## 2023-07-18 DIAGNOSIS — L57.0 AK (ACTINIC KERATOSIS): ICD-10-CM

## 2023-07-18 DIAGNOSIS — D48.9 NEOPLASM OF UNCERTAIN BEHAVIOR: ICD-10-CM

## 2023-07-18 PROCEDURE — 99213 OFFICE O/P EST LOW 20 MIN: CPT | Mod: 25,,, | Performed by: DERMATOLOGY

## 2023-07-18 PROCEDURE — 88305 PATHOLOGY, DERMATOLOGY: ICD-10-PCS | Mod: 26,,, | Performed by: PATHOLOGY

## 2023-07-18 PROCEDURE — 1159F MED LIST DOCD IN RCRD: CPT | Mod: ,,, | Performed by: DERMATOLOGY

## 2023-07-18 PROCEDURE — 11102 TANGNTL BX SKIN SINGLE LES: CPT | Mod: ,,, | Performed by: DERMATOLOGY

## 2023-07-18 PROCEDURE — 11103 TANGNTL BX SKIN EA SEP/ADDL: CPT | Mod: ,,, | Performed by: DERMATOLOGY

## 2023-07-18 PROCEDURE — 17000 DESTRUCT PREMALG LESION: CPT | Mod: XS,,, | Performed by: DERMATOLOGY

## 2023-07-18 PROCEDURE — 1159F PR MEDICATION LIST DOCUMENTED IN MEDICAL RECORD: ICD-10-PCS | Mod: ,,, | Performed by: DERMATOLOGY

## 2023-07-18 PROCEDURE — 17000 PR DESTRUCTION(LASER SURGERY,CRYOSURGERY,CHEMOSURGERY),PREMALIGNANT LESIONS,FIRST LESION: ICD-10-PCS | Mod: XS,,, | Performed by: DERMATOLOGY

## 2023-07-18 PROCEDURE — 99213 PR OFFICE/OUTPT VISIT, EST, LEVL III, 20-29 MIN: ICD-10-PCS | Mod: 25,,, | Performed by: DERMATOLOGY

## 2023-07-18 PROCEDURE — 1160F PR REVIEW ALL MEDS BY PRESCRIBER/CLIN PHARMACIST DOCUMENTED: ICD-10-PCS | Mod: ,,, | Performed by: DERMATOLOGY

## 2023-07-18 PROCEDURE — 88305 TISSUE EXAM BY PATHOLOGIST: CPT | Mod: TC,59,SUR | Performed by: DERMATOLOGY

## 2023-07-18 PROCEDURE — 11102 PR TANGENTIAL BIOPSY, SKIN, SINGLE LESION: ICD-10-PCS | Mod: ,,, | Performed by: DERMATOLOGY

## 2023-07-18 PROCEDURE — 17003 DESTRUCTION, PREMALIGNANT LESIONS; SECOND THROUGH 14 LESIONS: ICD-10-PCS | Mod: ,,, | Performed by: DERMATOLOGY

## 2023-07-18 PROCEDURE — 1160F RVW MEDS BY RX/DR IN RCRD: CPT | Mod: ,,, | Performed by: DERMATOLOGY

## 2023-07-18 PROCEDURE — 17003 DESTRUCT PREMALG LES 2-14: CPT | Mod: ,,, | Performed by: DERMATOLOGY

## 2023-07-18 PROCEDURE — 88305 TISSUE EXAM BY PATHOLOGIST: CPT | Mod: 26,,, | Performed by: PATHOLOGY

## 2023-07-18 PROCEDURE — 11103 PR TANGENTIAL BIOPSY, SKIN, EA ADDTL LESION: ICD-10-PCS | Mod: ,,, | Performed by: DERMATOLOGY

## 2023-07-18 NOTE — PROGRESS NOTES
[facility]   Caryl Watson MD    Patient Name: Tl Martinez  Patient YOB: 1945   Date of Service: 7/18/23    CC: Full Skin Exam    HPI: Tl Martinez is a 78 y.o. male presents today for a full skin exam.  Patient was last seen 01/23 and dermatologic history includes Aks and NMSC. Patient is concerned today about a lesion located on the right arm.  It has been present for 6 month(s). It has not been treated in the past.  Patient is also concerned about lesions on the chest.    Past Medical History:   Diagnosis Date    Atrial fibrillation     HTN (hypertension)      Past Surgical History:   Procedure Laterality Date    BASAL CELL CARCINOMA EXCISION      EXCISION OF SQUAMOUS CELL CARCINOMA       Review of patient's allergies indicates:  No Known Allergies    Current Outpatient Medications:     amLODIPine (NORVASC) 5 MG tablet, Take 5 mg by mouth once daily., Disp: , Rfl:     atorvastatin (LIPITOR) 10 MG tablet, Take 10 mg by mouth once daily., Disp: , Rfl:     benzonatate (TESSALON) 100 MG capsule, TAKE 2 CAPSULES BY MOUTH THREE TIMES DAILY AS NEEDED FOR COUGH, Disp: , Rfl:     celecoxib (CELEBREX) 100 MG capsule, Take 100 mg by mouth once daily., Disp: , Rfl:     ELIQUIS 2.5 mg Tab, , Disp: , Rfl:     HYDROcodone-acetaminophen (NORCO) 7.5-325 mg per tablet, TAKE 1/2 TO 1 TABLET BY MOUTH DAILY AS NEEDED, Disp: , Rfl:     ketorolac 0.5% (ACULAR) 0.5 % Drop, , Disp: , Rfl:     montelukast (SINGULAIR) 10 mg tablet, Take 10 mg by mouth 2 (two) times daily., Disp: , Rfl:     ofloxacin (OCUFLOX) 0.3 % ophthalmic solution, , Disp: , Rfl:     omeprazole (PRILOSEC) 20 MG capsule, , Disp: , Rfl:     prednisoLONE acetate (PRED FORTE) 1 % DrpS, , Disp: , Rfl:     sertraline (ZOLOFT) 50 MG tablet, , Disp: , Rfl:     SPIRIVA WITH HANDIHALER 18 mcg inhalation capsule, , Disp: , Rfl:     terbinafine HCL (LAMISIL) 250 mg tablet, Take 250 mg by mouth once daily., Disp: , Rfl:     triamcinolone  acetonide 0.025% (KENALOG) 0.025 % cream, Apply to AA on forehead BID tapering with improvement, Disp: 80 g, Rfl: 1    ROS: A focused review of systems was obtained and negative.     Exam: A full skin exam was performed including scalp, hair, face, neck, chest, back, abdomen, right arm, left arm, right hand, left hand, nails, right leg, and left leg.  All areas examined were normal expect as per below in assessment and plan.  General Appearance of the patient is well developed and well nourished.  Orientation: alert and oriented x 3.  Mood and affect: pleasant.    Assessment:   The primary encounter diagnosis was Other skin changes due to chronic exposure to nonionizing radiation. Diagnoses of SK (seborrheic keratosis), AK (actinic keratosis), Neoplasm of uncertain behavior, and History of nonmelanoma skin cancer were also pertinent to this visit.    Plan:        Seborrheic Keratosis (L82.1)  - Stuck-on, warty, greasy brown papule with pseudo-horn cysts scattered on the trunk and extremities    Plan: Counseling.  I counseled the patient regarding the following:  Skin Care: Seborrheic Keratoses are benign. No treatment is necessary.  Expectations: Seborrheic Keratoses are benign warty growths. Patients get more of them as they age    Plan: Reassurance    Actinic Keratoses(L57.0)  - Erythematous patches and papules with hyperkeratotic scale distributed on the left arm, left temple, left ear, and right arm.    Plan: Counseling.  I counseled the patient regarding the following:  Skin Care: Sun protective clothing and broad spectrum sunscreen can prevent the formation of Actinic  Keratoses. AKs can resolve with cryotherapy, photodynamic therapy, imiquimod, topical 5-FU.  Expectations: Actinic Keratoses are precancerous proliferations that occur within sun damaged skin. If untreated,  a small subset of AKs can develop into Squamous Cell Carcinoma.  Contact Office if: If AKs fail to resolve despite treatment, or if you  develop a side effect from therapy, such as  unbearable crusting, scabbing, redness and tenderness.    Plan: Liquid Nitrogen.  A total of 8 lesions were treated with liquid nitrogen for 2 freeze-thaw cycles lasting 5 seconds, located on the above locations.   The patient's consent was obtained including but not limited to risks of crusting, scabbing,  blistering, scarring, darker or lighter pigmentary change, recurrence, incomplete removal and infection.    Epidermal Cyst  - subcutaneous cyst with prominent follicular pore located on the left shoulder    Plan: Counseling  I counseled the patient regarding the following:  Skin Care: Epidermal Cysts require no specific skin care.  Expectations: Epidermal Cysts are benign sacs within the skin that contain keratin.  Contact Office if: Epidermal Cysts rupture or become red and tender.    - Will schedule excision of cyst after receiving biopsy path results    Neoplasm of Uncertain Behavior (D48.5)  - Brown pearly papule located on the left posterior upper arm  Ddx includes: BCC vs SK    Plan: Biopsy by Shave Method.  Location (1): Left posterior upper arm  Written consent was obtained and risks were reviewed including but not  limited to scarring, infection, bleeding, scabbing, incomplete removal, nerve damage and allergy to anesthesia.  The area was prepped with Chloraprep. Local anesthesia was obtained with approximately 0.5cc of 1% lidocaine  with epinephrine. A biopsy by shave method to the level of the dermis (sent for H and E) was performed using  a Dermablade on the above location. Aluminum chloride was used for hemostasis. Following the biopsy  Petrolatum and a bandage were applied. Patient will be notified of biopsy results. However, patient instructed to  call the office if not contacted within 2 weeks.    - Keratotic nodule located on the right proximal forearm  Ddx includes: SCC vs BCC    Plan: Biopsy by Shave Method.  Location (2): right proximal  forearm  Written consent was obtained and risks were reviewed including but not  limited to scarring, infection, bleeding, scabbing, incomplete removal, nerve damage and allergy to anesthesia.  The area was prepped with Chloraprep. Local anesthesia was obtained with approximately 0.5cc of 1% lidocaine  with epinephrine. A biopsy by shave method to the level of the dermis (sent for H and E) was performed using  a Dermablade on the above location. Aluminum chloride was used for hemostasis. Following the biopsy  Petrolatum and a bandage were applied. Patient will be notified of biopsy results. However, patient instructed to  call the office if not contacted within 2 weeks.    Plan: Counseling.  I counseled the patient regarding the following:  Instructions: Neoplasms of Uncertain Behavior can be observed, biopsied or surgically removed depending on the  level of clinical suspicion.  Instructions: Neoplasms of Uncertain Behavior can be observed, biopsied or surgically removed depending on the  level of clinical suspicion.  Contact Office if: patient develops any new lesions that fail to heal, ulcerate or bleed.    History of non-melanoma skin cancer (Z85.828)  - Well healed scar with NER  Associated diagnosis: Medical surveillance following completed treatment    Plan: Monitoring.    Other Skin Changes Due to Chronic Exposure of Nonionizing Radiation (L57.8)    Plan: Monitoring.     Plan: Sunscreen Recommendations.  I recommended a broad spectrum sunscreen with a SPF of 30 or higher. I explained that SPF 30 sunscreens block approximately 97 percent of the  sun's harmful rays. Sunscreens should be applied at least 15 minutes prior to expected sun exposure and then every 2 hours after that as long as  sun exposure continues. If swimming or exercising sunscreen should be reapplied every 45 minutes to an hour after getting wet or sweating. One  ounce, or the equivalent of a shot glass full of sunscreen, is adequate to  protect the skin not covered by a bathing suit. I also recommended a lip  balm with a sunscreen as well. Sun protective clothing can be used in lieu of sunscreen but must be worn the entire time you are exposed to the  sun's rays.    Follow up in about 6 months (around 1/18/2024) for FSE.    Caryl Watson MD

## 2023-07-20 LAB
ESTROGEN SERPL-MCNC: NORMAL PG/ML
INSULIN SERPL-ACNC: NORMAL U[IU]/ML
LAB AP GROSS DESCRIPTION: NORMAL
LAB AP LABORATORY NOTES: NORMAL
LAB AP SPEC A DDX: NORMAL
LAB AP SPEC A MORPHOLOGY: NORMAL
LAB AP SPEC A PROCEDURE: NORMAL
LAB AP SPEC B DDX: NORMAL
LAB AP SPEC B MORPHOLOGY: NORMAL
LAB AP SPEC B PROCEDURE: NORMAL
T3RU NFR SERPL: NORMAL %

## 2023-08-15 ENCOUNTER — PROCEDURE VISIT (OUTPATIENT)
Dept: DERMATOLOGY | Facility: CLINIC | Age: 78
End: 2023-08-15
Payer: COMMERCIAL

## 2023-08-15 VITALS
WEIGHT: 185.63 LBS | HEIGHT: 73 IN | SYSTOLIC BLOOD PRESSURE: 143 MMHG | DIASTOLIC BLOOD PRESSURE: 94 MMHG | HEART RATE: 55 BPM | BODY MASS INDEX: 24.6 KG/M2

## 2023-08-15 DIAGNOSIS — C44.619 BASAL CELL CARCINOMA OF LEFT UPPER ARM: Primary | ICD-10-CM

## 2023-08-15 DIAGNOSIS — D09.9 SQUAMOUS CELL CARCINOMA IN SITU: ICD-10-CM

## 2023-08-15 DIAGNOSIS — L57.0 AK (ACTINIC KERATOSIS): ICD-10-CM

## 2023-08-15 PROCEDURE — 88305 TISSUE EXAM BY PATHOLOGIST: CPT | Mod: 26,,, | Performed by: PATHOLOGY

## 2023-08-15 PROCEDURE — 88305 PATHOLOGY, DERMATOLOGY: ICD-10-PCS | Mod: 26,,, | Performed by: PATHOLOGY

## 2023-08-15 PROCEDURE — 12034 INTMD RPR S/TR/EXT 7.6-12.5: CPT | Mod: XS,51,, | Performed by: DERMATOLOGY

## 2023-08-15 PROCEDURE — 99499 NO LOS: ICD-10-PCS | Mod: ,,, | Performed by: DERMATOLOGY

## 2023-08-15 PROCEDURE — 11602 EXC TR-EXT MAL+MARG 1.1-2 CM: CPT | Mod: ,,, | Performed by: DERMATOLOGY

## 2023-08-15 PROCEDURE — 88305 TISSUE EXAM BY PATHOLOGIST: CPT | Mod: TC,SUR | Performed by: DERMATOLOGY

## 2023-08-15 PROCEDURE — 12034 PR LAYR CLOS WND TRUNK,ARM,LEG 7.6-12.5 CM: ICD-10-PCS | Mod: XS,51,, | Performed by: DERMATOLOGY

## 2023-08-15 PROCEDURE — 11602 PR EXC SKIN MALIG 1.1-2 CM TRUNK,ARM,LEG: ICD-10-PCS | Mod: ,,, | Performed by: DERMATOLOGY

## 2023-08-15 PROCEDURE — 17000 PR DESTRUCTION(LASER SURGERY,CRYOSURGERY,CHEMOSURGERY),PREMALIGNANT LESIONS,FIRST LESION: ICD-10-PCS | Mod: XS,,, | Performed by: DERMATOLOGY

## 2023-08-15 PROCEDURE — 17000 DESTRUCT PREMALG LESION: CPT | Mod: XS,,, | Performed by: DERMATOLOGY

## 2023-08-15 PROCEDURE — 99499 UNLISTED E&M SERVICE: CPT | Mod: ,,, | Performed by: DERMATOLOGY

## 2023-08-15 NOTE — PROGRESS NOTES
[facility]   Caryl Watson MD    Patient Name: Tl Martinez  Patient YOB: 1945   Date of Service: 8/15/23    CC: Excision    HPI: Tl Martinez is a 78 y.o. male here today for excision of SCCis, located on the right proximal forearm and BCC, located on the left posterior upper arm.      Past Medical History:   Diagnosis Date    Atrial fibrillation     HTN (hypertension)      Past Surgical History:   Procedure Laterality Date    BASAL CELL CARCINOMA EXCISION      EXCISION OF SQUAMOUS CELL CARCINOMA       Review of patient's allergies indicates:  No Known Allergies    Current Outpatient Medications:     amLODIPine (NORVASC) 5 MG tablet, Take 5 mg by mouth once daily., Disp: , Rfl:     atorvastatin (LIPITOR) 10 MG tablet, Take 10 mg by mouth once daily., Disp: , Rfl:     benzonatate (TESSALON) 100 MG capsule, TAKE 2 CAPSULES BY MOUTH THREE TIMES DAILY AS NEEDED FOR COUGH, Disp: , Rfl:     celecoxib (CELEBREX) 100 MG capsule, Take 100 mg by mouth once daily., Disp: , Rfl:     ELIQUIS 2.5 mg Tab, , Disp: , Rfl:     HYDROcodone-acetaminophen (NORCO) 7.5-325 mg per tablet, TAKE 1/2 TO 1 TABLET BY MOUTH DAILY AS NEEDED, Disp: , Rfl:     ketorolac 0.5% (ACULAR) 0.5 % Drop, , Disp: , Rfl:     montelukast (SINGULAIR) 10 mg tablet, Take 10 mg by mouth 2 (two) times daily., Disp: , Rfl:     ofloxacin (OCUFLOX) 0.3 % ophthalmic solution, , Disp: , Rfl:     omeprazole (PRILOSEC) 20 MG capsule, , Disp: , Rfl:     prednisoLONE acetate (PRED FORTE) 1 % DrpS, , Disp: , Rfl:     sertraline (ZOLOFT) 50 MG tablet, , Disp: , Rfl:     SPIRIVA WITH HANDIHALER 18 mcg inhalation capsule, , Disp: , Rfl:     terbinafine HCL (LAMISIL) 250 mg tablet, Take 250 mg by mouth once daily., Disp: , Rfl:     triamcinolone acetonide 0.025% (KENALOG) 0.025 % cream, Apply to AA on forehead BID tapering with improvement, Disp: 80 g, Rfl: 1    ROS: A focused review of systems was obtained and negative.     Exam: A  focused skin exam was performed and the biopsy site was identified.  General Appearance of the patient is well developed and well nourished.  Orientation: alert and oriented x 3.  Mood and affect: pleasant.    Vitals:    08/15/23 1038   BP: (!) 143/94   Pulse: (!) 55       Assessment:   The primary encounter diagnosis was Basal cell carcinoma of left upper arm. Diagnoses of Squamous cell carcinoma in situ and AK (actinic keratosis) were also pertinent to this visit.    Plan:  Excision  Accession Number: [id number]  Biopsy Photograph Reviewed: Yes    Procedure Note    Location (A): Right proximal forearm  Preop Size: 1.0 x 1.0 cm  Margin: 0.4 cm  Total Excised Diameter: 1.8 x 1.8 cm  Procedure: Excision - Elliptical  Anesthesia: local infiltration-1% lidocaine with epinephrine (12 cc)  Estimated Blood Loss: minimal  Complications: none    Repair Type: Intermediate  Repair Length: 5 cm    Consent was obtained from the patient. The risks and benefits to therapy were discussed in detail. Specifically, the risks of infection, scarring, bleeding, prolonged wound healing, incomplete removal, allergy to anesthesia, nerve injury and recurrence were addressed. Prior to the procedure, the treatment site was clearly identified and confirmed by the patient. All components of Universal Protocol/PAUSE Rule completed.    Procedure: The patient was placed in a comfortable position exposing the surgical site. The area was prepped with Hibiclens and draped in the usual fashion. An elliptical excision was performed, on the above listed location The margin was drawn around the clinically apparent lesion. An elliptical shape was then drawn on the skin incorporating the lesion and margins. Incisions were then made along these lines to the appropriate tissue plane and the lesion was extirpated. A 15 blade was used. The lesion was excised to the layer of the adipose tissue, removed and sent to pathology for processing and histologic  evaluation.    Intermediate layered repair was performed because closure of the subcutaneous tissue and superficial non-muscular fascia was required, because damaged skin surrounding defect made closure difficult, because extensive undermining required, and because Burow's triangles were required. Undermining was performed with blunt dissection. Hemostasis was achieved with electrocautery. The subcutaneous tissue and dermis were closed with 3-0 Vicryl (interrupted). Epidermal closure was achieved with 4-0 Ethilon(interrupted). Petrolatum + dry sterile dressing were applied. I reviewed with the patient in detail post-care instructions. Patient is not to engage in any heavy lifting, exercise, or swimming for the next 14 days. Should the patient develop any fevers, chills, bleeding, severe pain patient will contact the office immediately. Suture removal in 10 days.      Location (B): Left posterior upper arm  Preop Size: 1.1 x 1.0 cm  Margin: 0.4 cm  Total Excised Diameter: 1.9 x 1.8 cm  Procedure: Excision - Elliptical  Anesthesia: local infiltration-1% lidocaine with epinephrine (12 cc)  Estimated Blood Loss: minimal  Complications: none    Repair Type: Intermediate  Repair Length: 4.3 cm    Consent was obtained from the patient. The risks and benefits to therapy were discussed in detail. Specifically, the risks of infection, scarring, bleeding, prolonged wound healing, incomplete removal, allergy to anesthesia, nerve injury and recurrence were addressed. Prior to the procedure, the treatment site was clearly identified and confirmed by the patient. All components of Universal Protocol/PAUSE Rule completed.    Procedure: The patient was placed in a comfortable position exposing the surgical site. The area was prepped with Hibiclens and draped in the usual fashion. An elliptical excision was performed, on the above listed location The margin was drawn around the clinically apparent lesion. An elliptical shape was then  drawn on the skin incorporating the lesion and margins. Incisions were then made along these lines to the appropriate tissue plane and the lesion was extirpated. A 15 blade was used. The lesion was excised to the layer of the adipose tissue, removed and sent to pathology for processing and histologic evaluation.    Intermediate layered repair was performed because closure of the subcutaneous tissue and superficial non-muscular fascia was required, because damaged skin surrounding defect made closure difficult, because extensive undermining required, and because Burow's triangles were required. Undermining was performed with blunt dissection. Hemostasis was achieved with electrocautery. The subcutaneous tissue and dermis were closed with 3-0 Vicryl (interrupted). Epidermal closure was achieved with 4-0 Ethilon(interrupted). Petrolatum + dry sterile dressing were applied. I reviewed with the patient in detail post-care instructions. Patient is not to engage in any heavy lifting, exercise, or swimming for the next 14 days. Should the patient develop any fevers, chills, bleeding, severe pain patient will contact the office immediately. Suture removal in 10 days.    Actinic Keratoses(L57.0)  - Erythematous patches and papules with hyperkeratotic scale distributed on the left cheek.    Plan: Counseling.  I counseled the patient regarding the following:  Skin Care: Sun protective clothing and broad spectrum sunscreen can prevent the formation of Actinic  Keratoses. AKs can resolve with cryotherapy, photodynamic therapy, imiquimod, topical 5-FU.  Expectations: Actinic Keratoses are precancerous proliferations that occur within sun damaged skin. If untreated,  a small subset of AKs can develop into Squamous Cell Carcinoma.  Contact Office if: If AKs fail to resolve despite treatment, or if you develop a side effect from therapy, such as  unbearable crusting, scabbing, redness and tenderness.    Plan: Liquid Nitrogen.  A  total of 1 lesions were treated with liquid nitrogen for 2 freeze-thaw cycles lasting 5 seconds, located on the above locations.   The patient's consent was obtained including but not limited to risks of crusting, scabbing,  blistering, scarring, darker or lighter pigmentary change, recurrence, incomplete removal and infection.             Follow up in about 10 days (around 8/25/2023) for Suture removal.    Caryl Watson MD

## 2023-08-25 ENCOUNTER — CLINICAL SUPPORT (OUTPATIENT)
Dept: DERMATOLOGY | Facility: CLINIC | Age: 78
End: 2023-08-25
Payer: COMMERCIAL

## 2023-08-25 DIAGNOSIS — Z48.02 VISIT FOR SUTURE REMOVAL: Primary | ICD-10-CM

## 2023-08-25 PROCEDURE — 87077 CULTURE, WOUND: ICD-10-PCS | Mod: ,,, | Performed by: CLINICAL MEDICAL LABORATORY

## 2023-08-25 PROCEDURE — 87186 CULTURE, WOUND: ICD-10-PCS | Mod: ,,, | Performed by: CLINICAL MEDICAL LABORATORY

## 2023-08-25 PROCEDURE — 87186 SC STD MICRODIL/AGAR DIL: CPT | Mod: ,,, | Performed by: CLINICAL MEDICAL LABORATORY

## 2023-08-25 PROCEDURE — 87077 CULTURE AEROBIC IDENTIFY: CPT | Mod: ,,, | Performed by: CLINICAL MEDICAL LABORATORY

## 2023-08-25 PROCEDURE — 87070 CULTURE OTHR SPECIMN AEROBIC: CPT | Mod: ,,, | Performed by: CLINICAL MEDICAL LABORATORY

## 2023-08-25 PROCEDURE — 87070 CULTURE, WOUND: ICD-10-PCS | Mod: ,,, | Performed by: CLINICAL MEDICAL LABORATORY

## 2023-08-25 NOTE — PROGRESS NOTES
[facility]   Caryl Watson MD    Patient Name: Tl Martinez  Patient YOB: 1945   Date of Service: 8/25/23    CC: Suture removal    HPI: lT Martinez is a 78 y.o. male here today for suture removal on the right proximal forearm and left posterior upper arm.  The areas are healing well.  Patient denies fever, chills, puss, or redness to the area.    Past Medical History:   Diagnosis Date    Atrial fibrillation     HTN (hypertension)      Past Surgical History:   Procedure Laterality Date    BASAL CELL CARCINOMA EXCISION      EXCISION OF SQUAMOUS CELL CARCINOMA       Review of patient's allergies indicates:  No Known Allergies    Current Outpatient Medications:     amLODIPine (NORVASC) 5 MG tablet, Take 5 mg by mouth once daily., Disp: , Rfl:     atorvastatin (LIPITOR) 10 MG tablet, Take 10 mg by mouth once daily., Disp: , Rfl:     benzonatate (TESSALON) 100 MG capsule, TAKE 2 CAPSULES BY MOUTH THREE TIMES DAILY AS NEEDED FOR COUGH, Disp: , Rfl:     celecoxib (CELEBREX) 100 MG capsule, Take 100 mg by mouth once daily., Disp: , Rfl:     ELIQUIS 2.5 mg Tab, , Disp: , Rfl:     HYDROcodone-acetaminophen (NORCO) 7.5-325 mg per tablet, TAKE 1/2 TO 1 TABLET BY MOUTH DAILY AS NEEDED, Disp: , Rfl:     ketorolac 0.5% (ACULAR) 0.5 % Drop, , Disp: , Rfl:     montelukast (SINGULAIR) 10 mg tablet, Take 10 mg by mouth 2 (two) times daily., Disp: , Rfl:     ofloxacin (OCUFLOX) 0.3 % ophthalmic solution, , Disp: , Rfl:     omeprazole (PRILOSEC) 20 MG capsule, , Disp: , Rfl:     prednisoLONE acetate (PRED FORTE) 1 % DrpS, , Disp: , Rfl:     sertraline (ZOLOFT) 50 MG tablet, , Disp: , Rfl:     SPIRIVA WITH HANDIHALER 18 mcg inhalation capsule, , Disp: , Rfl:     terbinafine HCL (LAMISIL) 250 mg tablet, Take 250 mg by mouth once daily., Disp: , Rfl:     triamcinolone acetonide 0.025% (KENALOG) 0.025 % cream, Apply to AA on forehead BID tapering with improvement, Disp: 80 g, Rfl: 1      Exam: A  focused skin exam was performed. All areas examined were normal except as mentioned in the assessment and plan below.  General Appearance of the patient is well developed and well nourished.  Orientation: alert and oriented x 3.  Mood and affect: pleasant.    Assessment:   The encounter diagnosis was Visit for suture removal.    Plan:   Suture Removal (Global Period)  Body Locations: right proximal forearm and left posterior upper arm  I reviewed the pathology results with the patient in detail.  The examination of the site was clean, dry and intact. Sutures were removed.  Vaseline applied.    I counseled the patient regarding the following:  Expectations: Sutured wounds tend to have 50% of the strength of normal skin at the time of suture removal. Try avoiding rigorous exercise or lifting greater than 10 pounds.  Contact office if: you develop pain, redness, tenderness or pus at the surgical site.    A culture was obtained from the right proximal forearm           Follow up if symptoms worsen or fail to improve, for FSE already scheduled.    Caryl Watson MD

## 2023-08-27 LAB — MICROORGANISM SPEC CULT: ABNORMAL

## 2024-05-06 ENCOUNTER — HOSPITAL ENCOUNTER (EMERGENCY)
Facility: HOSPITAL | Age: 79
Discharge: HOME | End: 2024-05-06
Payer: COMMERCIAL

## 2024-05-06 VITALS
RESPIRATION RATE: 20 BRPM | BODY MASS INDEX: 23.2 KG/M2 | HEIGHT: 73 IN | SYSTOLIC BLOOD PRESSURE: 130 MMHG | WEIGHT: 175.06 LBS | DIASTOLIC BLOOD PRESSURE: 81 MMHG | HEART RATE: 94 BPM | OXYGEN SATURATION: 98 %

## 2024-05-06 DIAGNOSIS — R53.1 WEAKNESS: ICD-10-CM

## 2024-05-06 DIAGNOSIS — R50.9 FEVER: ICD-10-CM

## 2024-05-06 LAB
ALBUMIN SERPL BCP-MCNC: 3.4 G/DL (ref 3.5–5)
ALBUMIN/GLOB SERPL: 1 {RATIO}
ALP SERPL-CCNC: 84 U/L (ref 45–115)
ALT SERPL W P-5'-P-CCNC: 28 U/L (ref 16–61)
ANION GAP SERPL CALCULATED.3IONS-SCNC: 19 MMOL/L (ref 7–16)
AST SERPL W P-5'-P-CCNC: 19 U/L (ref 15–37)
BASOPHILS # BLD AUTO: 0.03 K/UL (ref 0–0.2)
BASOPHILS NFR BLD AUTO: 0.3 % (ref 0–1)
BILIRUB SERPL-MCNC: 0.6 MG/DL (ref ?–1.2)
BUN SERPL-MCNC: 19 MG/DL (ref 7–18)
BUN/CREAT SERPL: 13 (ref 6–20)
CALCIUM SERPL-MCNC: 8.9 MG/DL (ref 8.5–10.1)
CHLORIDE SERPL-SCNC: 104 MMOL/L (ref 98–107)
CO2 SERPL-SCNC: 22 MMOL/L (ref 21–32)
CREAT SERPL-MCNC: 1.42 MG/DL (ref 0.7–1.3)
DIFFERENTIAL METHOD BLD: ABNORMAL
EGFR (NO RACE VARIABLE) (RUSH/TITUS): 51 ML/MIN/1.73M2
EOSINOPHIL # BLD AUTO: 0.39 K/UL (ref 0–0.5)
EOSINOPHIL NFR BLD AUTO: 4.4 % (ref 1–4)
ERYTHROCYTE [DISTWIDTH] IN BLOOD BY AUTOMATED COUNT: 13.2 % (ref 11.5–14.5)
GLOBULIN SER-MCNC: 3.4 G/DL (ref 2–4)
GLUCOSE SERPL-MCNC: 84 MG/DL (ref 74–106)
HCT VFR BLD AUTO: 45.5 % (ref 40–54)
HGB BLD-MCNC: 15.4 G/DL (ref 13.5–18)
IMM GRANULOCYTES # BLD AUTO: 0.06 K/UL (ref 0–0.04)
IMM GRANULOCYTES NFR BLD: 0.7 % (ref 0–0.4)
INFLUENZA A MOLECULAR (OHS): NEGATIVE
INFLUENZA B MOLECULAR (OHS): NEGATIVE
LYMPHOCYTES # BLD AUTO: 1.66 K/UL (ref 1–4.8)
LYMPHOCYTES NFR BLD AUTO: 18.5 % (ref 27–41)
MCH RBC QN AUTO: 30.4 PG (ref 27–31)
MCHC RBC AUTO-ENTMCNC: 33.8 G/DL (ref 32–36)
MCV RBC AUTO: 89.9 FL (ref 80–96)
MONOCYTES # BLD AUTO: 1.34 K/UL (ref 0–0.8)
MONOCYTES NFR BLD AUTO: 15 % (ref 2–6)
MPC BLD CALC-MCNC: 9.1 FL (ref 9.4–12.4)
NEUTROPHILS # BLD AUTO: 5.47 K/UL (ref 1.8–7.7)
NEUTROPHILS NFR BLD AUTO: 61.1 % (ref 53–65)
NRBC # BLD AUTO: 0 X10E3/UL
NRBC, AUTO (.00): 0 %
PLATELET # BLD AUTO: 249 K/UL (ref 150–400)
POTASSIUM SERPL-SCNC: 4.3 MMOL/L (ref 3.5–5.1)
PROT SERPL-MCNC: 6.8 G/DL (ref 6.4–8.2)
RBC # BLD AUTO: 5.06 M/UL (ref 4.6–6.2)
SARS-COV-2 RDRP RESP QL NAA+PROBE: NEGATIVE
SODIUM SERPL-SCNC: 141 MMOL/L (ref 136–145)
TROPONIN I SERPL DL<=0.01 NG/ML-MCNC: 6.4 PG/ML
WBC # BLD AUTO: 8.95 K/UL (ref 4.5–11)

## 2024-05-06 PROCEDURE — 87635 SARS-COV-2 COVID-19 AMP PRB: CPT | Performed by: NURSE PRACTITIONER

## 2024-05-06 PROCEDURE — 25000242 PHARM REV CODE 250 ALT 637 W/ HCPCS: Performed by: NURSE PRACTITIONER

## 2024-05-06 PROCEDURE — 85025 COMPLETE CBC W/AUTO DIFF WBC: CPT | Performed by: NURSE PRACTITIONER

## 2024-05-06 PROCEDURE — 84484 ASSAY OF TROPONIN QUANT: CPT | Performed by: NURSE PRACTITIONER

## 2024-05-06 PROCEDURE — 93005 ELECTROCARDIOGRAM TRACING: CPT

## 2024-05-06 PROCEDURE — 96374 THER/PROPH/DIAG INJ IV PUSH: CPT

## 2024-05-06 PROCEDURE — 94640 AIRWAY INHALATION TREATMENT: CPT

## 2024-05-06 PROCEDURE — 87502 INFLUENZA DNA AMP PROBE: CPT | Performed by: NURSE PRACTITIONER

## 2024-05-06 PROCEDURE — 99284 EMERGENCY DEPT VISIT MOD MDM: CPT | Mod: ,,, | Performed by: NURSE PRACTITIONER

## 2024-05-06 PROCEDURE — 93010 ELECTROCARDIOGRAM REPORT: CPT | Mod: ,,, | Performed by: HOSPITALIST

## 2024-05-06 PROCEDURE — 80053 COMPREHEN METABOLIC PANEL: CPT | Performed by: NURSE PRACTITIONER

## 2024-05-06 PROCEDURE — 99285 EMERGENCY DEPT VISIT HI MDM: CPT | Mod: 25

## 2024-05-06 PROCEDURE — 63600175 PHARM REV CODE 636 W HCPCS: Performed by: NURSE PRACTITIONER

## 2024-05-06 RX ORDER — AZITHROMYCIN 250 MG/1
TABLET, FILM COATED ORAL
Qty: 6 TABLET | Refills: 0 | Status: SHIPPED | OUTPATIENT
Start: 2024-05-06 | End: 2024-05-11

## 2024-05-06 RX ORDER — METHYLPREDNISOLONE SOD SUCC 125 MG
125 VIAL (EA) INJECTION
Status: COMPLETED | OUTPATIENT
Start: 2024-05-06 | End: 2024-05-06

## 2024-05-06 RX ORDER — ALBUTEROL SULFATE 90 UG/1
2 AEROSOL, METERED RESPIRATORY (INHALATION) EVERY 6 HOURS PRN
Qty: 18 G | Refills: 0 | Status: SHIPPED | OUTPATIENT
Start: 2024-05-06

## 2024-05-06 RX ORDER — METHYLPREDNISOLONE 4 MG/1
TABLET ORAL
Qty: 21 EACH | Refills: 0 | Status: SHIPPED | OUTPATIENT
Start: 2024-05-06 | End: 2024-05-27

## 2024-05-06 RX ORDER — IPRATROPIUM BROMIDE AND ALBUTEROL SULFATE 2.5; .5 MG/3ML; MG/3ML
3 SOLUTION RESPIRATORY (INHALATION)
Status: COMPLETED | OUTPATIENT
Start: 2024-05-06 | End: 2024-05-06

## 2024-05-06 RX ORDER — IPRATROPIUM BROMIDE AND ALBUTEROL SULFATE 2.5; .5 MG/3ML; MG/3ML
SOLUTION RESPIRATORY (INHALATION)
Status: DISCONTINUED
Start: 2024-05-06 | End: 2024-05-06 | Stop reason: HOSPADM

## 2024-05-06 RX ADMIN — IPRATROPIUM BROMIDE AND ALBUTEROL SULFATE 3 ML: 2.5; .5 SOLUTION RESPIRATORY (INHALATION) at 05:05

## 2024-05-06 RX ADMIN — METHYLPREDNISOLONE SODIUM SUCCINATE 125 MG: 125 INJECTION, POWDER, FOR SOLUTION INTRAMUSCULAR; INTRAVENOUS at 04:05

## 2024-05-06 NOTE — ED PROVIDER NOTES
Encounter Date: 5/6/2024       History     Chief Complaint   Patient presents with    Weakness     HPI  Pt rec'd at 16:00 at start of my shift.  Pt is a 78 y/o WM who presents to ER with 1 week history of cough, congestion. He reports yesterday he began feeling chilled like he had fever and the cough worsened. He also developed CP & SOB at that time.     Review of patient's allergies indicates:  No Known Allergies  Past Medical History:   Diagnosis Date    Chronic atrial fibrillation     Essential (primary) hypertension      Past Surgical History:   Procedure Laterality Date    BASAL CELL CARCINOMA EXCISION      EXCISION OF SQUAMOUS CELL CARCINOMA       Family History   Problem Relation Name Age of Onset    Hypertension Mother       Social History     Tobacco Use    Smoking status: Never    Smokeless tobacco: Never     Review of Systems   Constitutional:  Positive for chills and fever.   HENT:  Positive for congestion. Negative for sinus pressure.    Respiratory:  Positive for cough, shortness of breath and wheezing.    Cardiovascular:  Positive for chest pain.   Gastrointestinal:  Negative for abdominal pain, diarrhea, nausea and vomiting.       Physical Exam     Initial Vitals [05/06/24 1755]   BP Pulse Resp Temp SpO2   -- 89 18 -- (!) 93 %      MAP       --         Physical Exam    Constitutional: He appears well-developed and well-nourished.   HENT:   Head: Normocephalic.   Cardiovascular:  Normal rate.           Pulmonary/Chest: He has wheezes.   Abdominal: Abdomen is soft.   Musculoskeletal:         General: No edema.     Neurological: He is alert and oriented to person, place, and time. GCS score is 15. GCS eye subscore is 4. GCS verbal subscore is 5. GCS motor subscore is 6.   Skin: Skin is warm and dry. No pallor.   Psychiatric: He has a normal mood and affect. His behavior is normal. Judgment and thought content normal.         Medical Screening Exam   See Full Note    ED Course   Procedures  Labs Reviewed    COMPREHENSIVE METABOLIC PANEL - Abnormal; Notable for the following components:       Result Value    Anion Gap 19 (*)     BUN 19 (*)     Creatinine 1.42 (*)     Albumin 3.4 (*)     eGFR 51 (*)     All other components within normal limits   CBC WITH DIFFERENTIAL - Abnormal; Notable for the following components:    MPV 9.1 (*)     Lymphocytes % 18.5 (*)     Monocytes % 15.0 (*)     Eosinophils % 4.4 (*)     Immature Granulocytes % 0.7 (*)     Monocytes, Absolute 1.34 (*)     Immature Granulocytes, Absolute 0.06 (*)     All other components within normal limits   TROPONIN I - Normal   INFLUENZA A & B BY MOLECULAR   CBC W/ AUTO DIFFERENTIAL    Narrative:     The following orders were created for panel order CBC auto differential.  Procedure                               Abnormality         Status                     ---------                               -----------         ------                     CBC with Differential[[id number]]       Abnormal            Final result                 Please view results for these tests on the individual orders.   SARS-COV-2 RNA AMPLIFICATION, QUAL          Imaging Results              X-Ray Chest PA And Lateral (Final result)  Result time 05/06/24 16:26:28      Final result by Job Sandoval MD (05/06/24 16:26:28)                   Impression:      No acute cardiopulmonary process      Electronically signed by: Job Sandoval  Date:    05/06/2024  Time:    16:26               Narrative:    EXAMINATION:  XR CHEST PA AND LATERAL    CLINICAL HISTORY:  .  Fever, unspecified    COMPARISON:  No previous similar    TECHNIQUE:  Chest x-ray PA and Lat    FINDINGS:  Cardiac silhouette is not enlarged.  There is no mediastinal mass.  There is mild aortic arch calcification.  There is no pulmonary vascular engorgement.    There is no confluent infiltrate to suggest pneumonia.  Nipple shadows overlie the lower lungs.  There is a partially calcified granuloma right upper lung.  There  is no layering pleural effusion.    There is mild thoracic spondylosis                                       Medications   albuterol-ipratropium (DUO-NEB) 2.5 mg-0.5 mg/3 mL nebulizer solution (has no administration in time range)   methylPREDNISolone sodium succinate injection 125 mg (125 mg Intravenous Given 5/6/24 1643)   albuterol-ipratropium 2.5 mg-0.5 mg/3 mL nebulizer solution 3 mL (3 mLs Nebulization Given 5/6/24 1755)     Medical Decision Making  Amount and/or Complexity of Data Reviewed  Labs: ordered.  Radiology: ordered.    Risk  Prescription drug management.                Pt presents with pneumonia like symptoms. Will get CXR, lab, give IV steroid and albuterol breathing tx and reassess.      18:10 - pt ready to go, states feels better after IV steroid, CXR clear, will d/c with prophylactic pneumonia tx - sent to pt's pharmacy of choice on file                   Clinical Impression:   Final diagnoses:  [R50.9] Fever       ED Disposition Condition    Discharge Stable          ED Prescriptions       Medication Sig Dispense Start Date End Date Auth. Provider    azithromycin (Z-SAULO) 250 MG tablet Take 2 tablets by mouth on day 1; Take 1 tablet by mouth on days 2-5 6 tablet 5/6/2024 5/11/2024 Noy Dumas FNP    methylPREDNISolone (MEDROL DOSEPACK) 4 mg tablet use as directed 21 each 5/6/2024 5/27/2024 Noy Dumas FNP    albuterol (PROVENTIL HFA) 90 mcg/actuation inhaler Inhale 2 puffs into the lungs every 6 (six) hours as needed for Wheezing. Rescue 18 g 5/6/2024 -- Noy Dumas FNP          Follow-up Information    None          Noy Dumas FNP  05/06/24 1618       Noy Dumas FNP  05/06/24 4636

## 2024-05-06 NOTE — LETTER
Patient: PHYLLIS Martinez  YOB: 1945  Date: 5/6/2024 Time: 6:09 PM  Location: [facility]    Leaving the Hospital Against Medical Advice    Chart #:[id number]    This will certify that I, the undersigned,    ______________________________________________________________________    A patient in the above named medical center, having requested discharge and removal from the medical [place] against the advice of my attending physician(s), hereby release [facility], its physicians, officers and employees, severally and individually, from any and all liability of any nature whatsoever for any injury or harm or complication of any kind that may result directly or indirectly, by reason of my terminating my stay as a patient at [facility] and my departure from [facility], and hereby waive any and all rights of action I may now have or later acquire as a result of my voluntary departure from [facility] and the termination of my stay as a patient therein.    This release is made with the full knowledge of the danger that may result from the action which I am taking.      Date:_______________________                         ___________________________                                                                                    Patient/Legal Representative    Witness:        ____________________________                          ___________________________  Nurse                                                                        Physician

## 2024-05-06 NOTE — ED TRIAGE NOTES
Presents to ED from clinic for c/o generalized weakness that has been ongoing for a few days. Patient had near syncopal episode in clinic.

## 2024-05-06 NOTE — DISCHARGE INSTRUCTIONS
Follow up with your PCP in 2 days for recheck. Take antibiotics, steroids, and inhaler as prescribed. Return to ER for any new or worsening symptoms.

## 2024-05-09 LAB
OHS QRS DURATION: 74 MS
OHS QTC CALCULATION: 416 MS

## 2024-05-29 RX ORDER — ALBUTEROL SULFATE 90 UG/1
2 AEROSOL, METERED RESPIRATORY (INHALATION) EVERY 6 HOURS PRN
Qty: 18 G | Refills: 0 | OUTPATIENT
Start: 2024-05-29

## 2025-01-02 ENCOUNTER — HOSPITAL ENCOUNTER (OUTPATIENT)
Dept: RADIOLOGY | Facility: HOSPITAL | Age: 80
Discharge: HOME | End: 2025-01-02
Attending: ORTHOPAEDIC SURGERY
Payer: COMMERCIAL

## 2025-01-02 ENCOUNTER — OFFICE VISIT (OUTPATIENT)
Dept: ORTHOPEDICS | Facility: CLINIC | Age: 80
End: 2025-01-02
Payer: COMMERCIAL

## 2025-01-02 DIAGNOSIS — M25.571 ACUTE RIGHT ANKLE PAIN: Primary | ICD-10-CM

## 2025-01-02 DIAGNOSIS — M25.532 LEFT WRIST PAIN: ICD-10-CM

## 2025-01-02 DIAGNOSIS — M25.532 LEFT WRIST PAIN: Primary | ICD-10-CM

## 2025-01-02 PROCEDURE — 99999 PR PBB SHADOW E&M-EST. PATIENT-LVL II: CPT | Mod: PBBFAC,,, | Performed by: ORTHOPAEDIC SURGERY

## 2025-01-02 PROCEDURE — 73110 X-RAY EXAM OF WRIST: CPT | Mod: TC,LT

## 2025-01-02 PROCEDURE — 99203 OFFICE O/P NEW LOW 30 MIN: CPT | Mod: S$PBB,,, | Performed by: ORTHOPAEDIC SURGERY

## 2025-01-02 PROCEDURE — 99212 OFFICE O/P EST SF 10 MIN: CPT | Mod: PBBFAC,25 | Performed by: ORTHOPAEDIC SURGERY

## 2025-01-02 NOTE — PROGRESS NOTES
CC:  Wrist pain     PREVIOUS INFO:        HISTORY:   1/2/2025    Tl Martinez  is a 79 y.o. patient has had left wrist swelling for years he says he called him with the bursa level of the radial styloid region with the pain but he fell he has been having markedly increased pain of his wrist after a fall 2 days ago January 31st      PAST MEDICAL HISTORY:   Past Medical History:   Diagnosis Date    Chronic atrial fibrillation     Essential (primary) hypertension           PAST SURGICAL HISTORY:   Past Surgical History:   Procedure Laterality Date    BASAL CELL CARCINOMA EXCISION      EXCISION OF SQUAMOUS CELL CARCINOMA            ALLERGIES: Review of patient's allergies indicates:  No Known Allergies     MEDICATIONS :    Current Outpatient Medications:     albuterol (PROVENTIL HFA) 90 mcg/actuation inhaler, Inhale 2 puffs into the lungs every 6 (six) hours as needed for Wheezing. Rescue, Disp: 18 g, Rfl: 0    amLODIPine (NORVASC) 5 MG tablet, Take 5 mg by mouth once daily., Disp: , Rfl:     atorvastatin (LIPITOR) 10 MG tablet, Take 10 mg by mouth once daily., Disp: , Rfl:     benzonatate (TESSALON) 100 MG capsule, TAKE 2 CAPSULES BY MOUTH THREE TIMES DAILY AS NEEDED FOR COUGH, Disp: , Rfl:     celecoxib (CELEBREX) 100 MG capsule, Take 100 mg by mouth once daily., Disp: , Rfl:     ELIQUIS 2.5 mg Tab, , Disp: , Rfl:     HYDROcodone-acetaminophen (NORCO) 7.5-325 mg per tablet, TAKE 1/2 TO 1 TABLET BY MOUTH DAILY AS NEEDED, Disp: , Rfl:     ketorolac 0.5% (ACULAR) 0.5 % Drop, , Disp: , Rfl:     montelukast (SINGULAIR) 10 mg tablet, Take 10 mg by mouth 2 (two) times daily., Disp: , Rfl:     ofloxacin (OCUFLOX) 0.3 % ophthalmic solution, , Disp: , Rfl:     omeprazole (PRILOSEC) 20 MG capsule, , Disp: , Rfl:     prednisoLONE acetate (PRED FORTE) 1 % DrpS, , Disp: , Rfl:     sertraline (ZOLOFT) 50 MG tablet, , Disp: , Rfl:     SPIRIVA WITH HANDIHALER 18 mcg inhalation capsule, , Disp: , Rfl:     terbinafine  HCL (LAMISIL) 250 mg tablet, Take 250 mg by mouth once daily., Disp: , Rfl:     triamcinolone acetonide 0.025% (KENALOG) 0.025 % cream, Apply to AA on forehead BID tapering with improvement, Disp: 80 g, Rfl: 1     SOCIAL HISTORY:   Social History     Socioeconomic History    Marital status:    Tobacco Use    Smoking status: Never    Smokeless tobacco: Never        ROS    FAMILY HISTORY:   Family History   Problem Relation Name Age of Onset    Hypertension Mother            PHYSICAL EXAM: There were no vitals filed for this visit.            There is no height or weight on file to calculate BMI.     In general, this is a well-developed, well-nourished male . The patient is alert, oriented and cooperative.      HEENT:  Normocephalic, atraumatic.  Extraocular movements are intact bilaterally.  The oropharynx is benign.       NECK:  Nontender with good range of motion.      PULMONARY: Respirations are even and non-labored.       CARDIOVASCULAR: Pulses regular by peripheral palpation.       ABDOMEN:  Soft, non-tender, non-distended.        EXTREMITIES:  Tender swollen left wrist on the radial side motion is not attempted today skin is intact    Ortho Exam      RADIOGRAPHIC FINDINGS:  Left wrist three views AP lateral oblique view there is a sclerotic changes consistent with a chronic scaphoid nonunion with marked degenerative changes of the scaphoid radial joint and the capitate lumen lunate joint marked shortening of the scaphoid no definite acute fracture appreciated      .      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         Amadou García III      (Subject to voice  recognition error, transcription service not allowed)

## 2025-01-16 ENCOUNTER — HOSPITAL ENCOUNTER (OUTPATIENT)
Dept: RADIOLOGY | Facility: HOSPITAL | Age: 80
Discharge: HOME | End: 2025-01-16
Attending: ORTHOPAEDIC SURGERY
Payer: COMMERCIAL

## 2025-01-16 DIAGNOSIS — M25.571 ACUTE RIGHT ANKLE PAIN: ICD-10-CM

## 2025-01-16 PROCEDURE — 73200 CT UPPER EXTREMITY W/O DYE: CPT | Mod: TC,LT

## 2025-01-22 ENCOUNTER — TELEPHONE (OUTPATIENT)
Dept: ORTHOPEDICS | Facility: CLINIC | Age: 80
End: 2025-01-22
Payer: COMMERCIAL

## 2025-01-22 NOTE — TELEPHONE ENCOUNTER
----- Message from Amadou García MD sent at 1/22/2025  2:56 PM CST -----  Chronic scaphoid fracture with marked degenerative changes  Referred to hand surgeon for possible fusion

## 2025-02-20 ENCOUNTER — OFFICE VISIT (OUTPATIENT)
Dept: DERMATOLOGY | Facility: CLINIC | Age: 80
End: 2025-02-20
Payer: COMMERCIAL

## 2025-02-20 DIAGNOSIS — L57.0 AK (ACTINIC KERATOSIS): ICD-10-CM

## 2025-02-20 DIAGNOSIS — Z85.828 HISTORY OF NONMELANOMA SKIN CANCER: ICD-10-CM

## 2025-02-20 DIAGNOSIS — L82.1 SK (SEBORRHEIC KERATOSIS): ICD-10-CM

## 2025-02-20 DIAGNOSIS — L57.8 OTHER SKIN CHANGES DUE TO CHRONIC EXPOSURE TO NONIONIZING RADIATION: Primary | ICD-10-CM

## 2025-02-20 NOTE — PROGRESS NOTES
[facility]   Caryl Watson MD    Patient Name: Tl Martinez  Patient YOB: 1945   Date of Service: 2/20/25    CC: Above the waist exam    HPI: Tl Martinez is a 80 y.o. male presents today for an above the waist skin exam.  Patient was last seen 07/18/2023 and dermatologic history includes Aks and NMSC. Patient is concerned today about a lesion located on the left cheek.  It has been present for 1 year(s). It has not been treated in the past.  Patient is also concerned about lesions located on the chest, arms, and right temple.    Past Medical History:   Diagnosis Date    Chronic atrial fibrillation     Essential (primary) hypertension      Past Surgical History:   Procedure Laterality Date    BASAL CELL CARCINOMA EXCISION      EXCISION OF SQUAMOUS CELL CARCINOMA       Review of patient's allergies indicates:  No Known Allergies  Current Medications[1]    ROS: A focused review of systems was obtained and negative.     Exam: A sun exposed skin exam was performed including scalp, hair, face, neck, chest, back, abdomen, right arm, left arm, right hand, left hand, and nailsnails..  All areas examined were normal expect as per below in assessment and plan.  General Appearance of the patient is well developed and well nourished.  Orientation: alert and oriented x 3.  Mood and affect: pleasant.    Assessment:   The primary encounter diagnosis was Other skin changes due to chronic exposure to nonionizing radiation. Diagnoses of SK (seborrheic keratosis), History of nonmelanoma skin cancer, and AK (actinic keratosis) were also pertinent to this visit.    Plan:        Other Skin Changes Due to Chronic Exposure of Nonionizing Radiation (L57.8)    Plan: Monitoring.     Plan: Sunscreen Recommendations.  I recommended a broad spectrum sunscreen with a SPF of 30 or higher. I explained that SPF 30 sunscreens block approximately 97 percent of the  sun's harmful rays. Sunscreens should be  applied at least 15 minutes prior to expected sun exposure and then every 2 hours after that as long as  sun exposure continues. If swimming or exercising sunscreen should be reapplied every 45 minutes to an hour after getting wet or sweating. One  ounce, or the equivalent of a shot glass full of sunscreen, is adequate to protect the skin not covered by a bathing suit. I also recommended a lip  balm with a sunscreen as well. Sun protective clothing can be used in lieu of sunscreen but must be worn the entire time you are exposed to the  sun's rays.    Seborrheic Keratosis (L82.1)  - Stuck-on, warty, greasy brown papule with pseudo-horn cysts scattered on the trunk and extremities    Plan: Counseling.  I counseled the patient regarding the following:  Skin Care: Seborrheic Keratoses are benign. No treatment is necessary.  Expectations: Seborrheic Keratoses are benign warty growths. Patients get more of them as they age    Plan: Reassurance    History of non-melanoma skin cancer (Z85.828)  - Well healed scar with NER  Associated diagnosis: Medical surveillance following completed treatment    Plan: Monitoring.    Actinic Keratoses(L57.0)  - Erythematous patches and papules with hyperkeratotic scale distributed on the right hand, right arm, right temple, left cheek, left arm, left hand,  .    Plan: Counseling.  I counseled the patient regarding the following:  Skin Care: Sun protective clothing and broad spectrum sunscreen can prevent the formation of Actinic  Keratoses. AKs can resolve with cryotherapy, photodynamic therapy, imiquimod, topical 5-FU.  Expectations: Actinic Keratoses are precancerous proliferations that occur within sun damaged skin. If untreated,  a small subset of AKs can develop into Squamous Cell Carcinoma.  Contact Office if: If AKs fail to resolve despite treatment, or if you develop a side effect from therapy, such as  unbearable crusting, scabbing, redness and tenderness.    Plan: Liquid  Nitrogen.  A total of 7 lesions were treated with liquid nitrogen for 2 freeze-thaw cycles lasting 5 seconds, located on the above locations.   The patient's consent was obtained including but not limited to risks of crusting, scabbing,  blistering, scarring, darker or lighter pigmentary change, recurrence, incomplete removal and infection.          Follow up in about 1 year (around 2/20/2026) for fse.    Caryl Watson MD         [1]   Current Outpatient Medications:     albuterol (PROVENTIL HFA) 90 mcg/actuation inhaler, Inhale 2 puffs into the lungs every 6 (six) hours as needed for Wheezing. Rescue, Disp: 18 g, Rfl: 0    amLODIPine (NORVASC) 5 MG tablet, Take 5 mg by mouth once daily., Disp: , Rfl:     atorvastatin (LIPITOR) 10 MG tablet, Take 10 mg by mouth once daily., Disp: , Rfl:     benzonatate (TESSALON) 100 MG capsule, TAKE 2 CAPSULES BY MOUTH THREE TIMES DAILY AS NEEDED FOR COUGH, Disp: , Rfl:     celecoxib (CELEBREX) 100 MG capsule, Take 100 mg by mouth once daily., Disp: , Rfl:     ELIQUIS 2.5 mg Tab, , Disp: , Rfl:     HYDROcodone-acetaminophen (NORCO) 7.5-325 mg per tablet, TAKE 1/2 TO 1 TABLET BY MOUTH DAILY AS NEEDED, Disp: , Rfl:     ketorolac 0.5% (ACULAR) 0.5 % Drop, , Disp: , Rfl:     montelukast (SINGULAIR) 10 mg tablet, Take 10 mg by mouth 2 (two) times daily., Disp: , Rfl:     ofloxacin (OCUFLOX) 0.3 % ophthalmic solution, , Disp: , Rfl:     omeprazole (PRILOSEC) 20 MG capsule, , Disp: , Rfl:     prednisoLONE acetate (PRED FORTE) 1 % DrpS, , Disp: , Rfl:     sertraline (ZOLOFT) 50 MG tablet, , Disp: , Rfl:     SPIRIVA WITH HANDIHALER 18 mcg inhalation capsule, , Disp: , Rfl:     terbinafine HCL (LAMISIL) 250 mg tablet, Take 250 mg by mouth once daily., Disp: , Rfl:     triamcinolone acetonide 0.025% (KENALOG) 0.025 % cream, Apply to AA on forehead BID tapering with improvement, Disp: 80 g, Rfl: 1

## 2025-04-24 ENCOUNTER — OFFICE VISIT (OUTPATIENT)
Dept: ORTHOPEDICS | Facility: CLINIC | Age: 80
End: 2025-04-24
Payer: COMMERCIAL

## 2025-04-24 ENCOUNTER — TELEPHONE (OUTPATIENT)
Dept: ORTHOPEDICS | Facility: CLINIC | Age: 80
End: 2025-04-24
Payer: COMMERCIAL

## 2025-04-24 VITALS
SYSTOLIC BLOOD PRESSURE: 120 MMHG | RESPIRATION RATE: 18 BRPM | OXYGEN SATURATION: 97 % | BODY MASS INDEX: 23.09 KG/M2 | HEIGHT: 73 IN | DIASTOLIC BLOOD PRESSURE: 78 MMHG | HEART RATE: 83 BPM

## 2025-04-24 DIAGNOSIS — G56.02 CARPAL TUNNEL SYNDROME OF LEFT WRIST: Primary | ICD-10-CM

## 2025-04-24 PROCEDURE — 3288F FALL RISK ASSESSMENT DOCD: CPT | Mod: ,,, | Performed by: ORTHOPAEDIC SURGERY

## 2025-04-24 PROCEDURE — 3078F DIAST BP <80 MM HG: CPT | Mod: ,,, | Performed by: ORTHOPAEDIC SURGERY

## 2025-04-24 PROCEDURE — 99999 PR PBB SHADOW E&M-EST. PATIENT-LVL V: CPT | Mod: PBBFAC,,, | Performed by: ORTHOPAEDIC SURGERY

## 2025-04-24 PROCEDURE — 1159F MED LIST DOCD IN RCRD: CPT | Mod: ,,, | Performed by: ORTHOPAEDIC SURGERY

## 2025-04-24 PROCEDURE — 1101F PT FALLS ASSESS-DOCD LE1/YR: CPT | Mod: ,,, | Performed by: ORTHOPAEDIC SURGERY

## 2025-04-24 PROCEDURE — 3074F SYST BP LT 130 MM HG: CPT | Mod: ,,, | Performed by: ORTHOPAEDIC SURGERY

## 2025-04-24 PROCEDURE — 99215 OFFICE O/P EST HI 40 MIN: CPT | Mod: PBBFAC | Performed by: ORTHOPAEDIC SURGERY

## 2025-04-24 PROCEDURE — 99213 OFFICE O/P EST LOW 20 MIN: CPT | Mod: S$PBB,,, | Performed by: ORTHOPAEDIC SURGERY

## 2025-04-24 NOTE — PROGRESS NOTES
CC:   Chief Complaint   Patient presents with    Left Wrist - Pain, Follow-up     RECHECK AFTER CT         PREVIOUS INFO:        HISTORY:   4/24/2025    Tl Martinez  is a 80 y.o. patient was seen previously with a chronic scaphoid fracture with severe collapse and DJD involving multiple carpal bones yet this was the pain was flared up with a fall back in January but his DJD and fracture were obviously old    Today the patient is really complaining more of numbness and tingling or pain into the thumb index long finger radiating up into his forearm to his elbow region.  He does have a night splint that he wears some that helps      PAST MEDICAL HISTORY:   Past Medical History:   Diagnosis Date    Chronic atrial fibrillation     Essential (primary) hypertension           PAST SURGICAL HISTORY:   Past Surgical History:   Procedure Laterality Date    BASAL CELL CARCINOMA EXCISION      EXCISION OF SQUAMOUS CELL CARCINOMA            ALLERGIES: Review of patient's allergies indicates:  No Known Allergies     MEDICATIONS :  Current Medications[1]     SOCIAL HISTORY: Social History[2]     ROS    FAMILY HISTORY:   Family History   Problem Relation Name Age of Onset    Hypertension Mother            PHYSICAL EXAM:   Vitals:    04/24/25 1435   BP: 120/78   Pulse: 83   Resp: 18               Body mass index is 23.09 kg/m².     In general, this is a well-developed, well-nourished male . The patient is alert, oriented and cooperative.      HEENT:  Normocephalic, atraumatic.  Extraocular movements are intact bilaterally.  The oropharynx is benign.       NECK:  Nontender with good range of motion.      PULMONARY: Respirations are even and non-labored.       CARDIOVASCULAR: Pulses regular by peripheral palpation.       ABDOMEN:  Soft, non-tender, non-distended.        EXTREMITIES:  Motion in his wrist does cause some pain but that is not the worst with the pain he is having at night    Ortho Exam      RADIOGRAPHIC  FINDINGS:   CT left wrist    Impression:     Fragmentation of the scaphoid possibly from prior fracture/trauma.     Severe degenerative changes of the carpal bones especially at the lunocapitate joint.        Electronically signed by:Sia Mckinley MD  Date:                                            01/16/2025  Time:                                           14:07  .      IMPRESSION:  Assessment this is probably carpal tunnel in addition to the DJD of the wrist for the carpal tunnel sounds like it is giving him most of the troubles    PLAN:  EMG is of the left upper extremity patient is on chronic blood thinners Eliquis        No follow-ups on file.         Amadou García III      (Subject to voice recognition error, transcription service not allowed)           [1]   Current Outpatient Medications:     albuterol (PROVENTIL HFA) 90 mcg/actuation inhaler, Inhale 2 puffs into the lungs every 6 (six) hours as needed for Wheezing. Rescue, Disp: 18 g, Rfl: 0    amLODIPine (NORVASC) 5 MG tablet, Take 5 mg by mouth once daily., Disp: , Rfl:     atorvastatin (LIPITOR) 10 MG tablet, Take 10 mg by mouth once daily., Disp: , Rfl:     benzonatate (TESSALON) 100 MG capsule, TAKE 2 CAPSULES BY MOUTH THREE TIMES DAILY AS NEEDED FOR COUGH, Disp: , Rfl:     celecoxib (CELEBREX) 100 MG capsule, Take 100 mg by mouth once daily., Disp: , Rfl:     ELIQUIS 2.5 mg Tab, , Disp: , Rfl:     HYDROcodone-acetaminophen (NORCO) 7.5-325 mg per tablet, TAKE 1/2 TO 1 TABLET BY MOUTH DAILY AS NEEDED, Disp: , Rfl:     ketorolac 0.5% (ACULAR) 0.5 % Drop, , Disp: , Rfl:     montelukast (SINGULAIR) 10 mg tablet, Take 10 mg by mouth 2 (two) times daily., Disp: , Rfl:     ofloxacin (OCUFLOX) 0.3 % ophthalmic solution, , Disp: , Rfl:     omeprazole (PRILOSEC) 20 MG capsule, , Disp: , Rfl:     prednisoLONE acetate (PRED FORTE) 1 % DrpS, , Disp: , Rfl:     sertraline (ZOLOFT) 50 MG tablet, , Disp: , Rfl:     SPIRIVA WITH HANDIHALER 18 mcg inhalation  capsule, , Disp: , Rfl:     terbinafine HCL (LAMISIL) 250 mg tablet, Take 250 mg by mouth once daily., Disp: , Rfl:     triamcinolone acetonide 0.025% (KENALOG) 0.025 % cream, Apply to AA on forehead BID tapering with improvement, Disp: 80 g, Rfl: 1  [2]   Social History  Socioeconomic History    Marital status:    Tobacco Use    Smoking status: Never    Smokeless tobacco: Never

## 2025-04-24 NOTE — TELEPHONE ENCOUNTER
TRIED TO REACH THE PATIENT NO ANSWER- SPOKE TO WIFE FOR THE PATIENT TO HOLD HIS BLOOD THINNER TWO DAYS PRIOR TO HIS NERVE STUDIES- WE WILL CALL HIM WITH THE RESULTS ONCE HE HAS THEM DONE

## 2025-05-19 ENCOUNTER — TELEPHONE (OUTPATIENT)
Dept: ORTHOPEDICS | Facility: CLINIC | Age: 80
End: 2025-05-19
Payer: COMMERCIAL

## 2025-05-19 NOTE — TELEPHONE ENCOUNTER
Copied from CRM #[id number]. Topic: General Inquiry - Patient Advice  >> May 19, 2025 10:48 AM Brandi wrote:  Who Called: Lynnette Martinez ( wife) for Tl Martinez    Caller is requesting assistance/information from provider's office.    Lynnette would like to see if the office can schedule the patient, Tl, with Dr. Romero in Sports Medicine in [place]. Says the patient has carpal tunnel and would need surgery.       Preferred Method of Contact: Phone Call  Patient's Preferred Phone Number on File: [phone]   Best Call Back Number, if different: [phone]  Additional Information:  if possible call wife cell first (second number)

## 2025-05-21 ENCOUNTER — TELEPHONE (OUTPATIENT)
Dept: ORTHOPEDICS | Facility: CLINIC | Age: 80
End: 2025-05-21
Payer: COMMERCIAL

## 2025-05-21 DIAGNOSIS — M25.532 LEFT WRIST PAIN: Primary | ICD-10-CM
